# Patient Record
Sex: FEMALE | Race: BLACK OR AFRICAN AMERICAN | NOT HISPANIC OR LATINO | Employment: PART TIME | URBAN - NONMETROPOLITAN AREA
[De-identification: names, ages, dates, MRNs, and addresses within clinical notes are randomized per-mention and may not be internally consistent; named-entity substitution may affect disease eponyms.]

---

## 2019-02-14 ENCOUNTER — HOSPITAL ENCOUNTER (EMERGENCY)
Facility: HOSPITAL | Age: 34
Discharge: HOME | End: 2019-02-14
Attending: EMERGENCY MEDICINE
Payer: COMMERCIAL

## 2019-02-14 VITALS
RESPIRATION RATE: 20 BRPM | DIASTOLIC BLOOD PRESSURE: 79 MMHG | BODY MASS INDEX: 43.2 KG/M2 | HEIGHT: 65 IN | WEIGHT: 259.26 LBS | OXYGEN SATURATION: 100 % | HEART RATE: 101 BPM | TEMPERATURE: 96.2 F | SYSTOLIC BLOOD PRESSURE: 126 MMHG

## 2019-02-14 DIAGNOSIS — H10.9 CONJUNCTIVITIS: Primary | ICD-10-CM

## 2019-02-14 PROCEDURE — 99283 EMERGENCY DEPT VISIT LOW MDM: CPT

## 2019-02-16 NOTE — ED PROVIDER NOTES
History  Chief Complaint   Patient presents with    Eye Problem     bilateral eyes crusted shut  conjunctiva red  History provided by:  Patient  Eye Problem   Location:  Both eyes  Quality: burning itching red crusted  Severity:  Mild  Onset quality:  Sudden  Duration:  4 hours  Timing:  Constant  Progression:  Worsening  Chronicity:  New  Context comment:  1 yr old daughter currently with pink eye this week  Relieved by:  Nothing  Worsened by:  Nothing  Ineffective treatments:  Commercial eye wash  Associated symptoms: crusting, discharge, itching and redness    Associated symptoms: no blurred vision, no decreased vision, no double vision, no facial rash, no headaches, no inflammation, no nausea, no numbness, no photophobia, no scotomas, no swelling, no tearing, no tingling, no vomiting and no weakness    Discharge:     Quality:  Mucous  Risk factors: exposure to pinkeye    Risk factors: no conjunctival hemorrhage, no previous injury to eye, no recent herpes zoster and no recent URI    Risk factors comment:  Wears glasses "sometimes", no contact lenses      Prior to Admission Medications   Prescriptions Last Dose Informant Patient Reported? Taking?   naproxen (NAPROSYN) 500 mg tablet   No No   Sig: Take 1 tablet by mouth 2 (two) times a day with meals  Facility-Administered Medications: None       History reviewed  No pertinent past medical history  Past Surgical History:   Procedure Laterality Date     SECTION         History reviewed  No pertinent family history  I have reviewed and agree with the history as documented  Social History     Tobacco Use    Smoking status: Current Every Day Smoker     Packs/day: 1 00    Smokeless tobacco: Never Used   Substance Use Topics    Alcohol use: No    Drug use: No        Review of Systems   Constitutional: Negative for chills and fever  HENT: Negative for congestion, dental problem, ear pain, facial swelling and sore throat      Eyes: Positive for discharge, redness and itching  Negative for blurred vision, double vision, photophobia, pain and visual disturbance  Respiratory: Negative for cough and shortness of breath  Cardiovascular: Negative for chest pain and leg swelling  Gastrointestinal: Negative for abdominal pain, diarrhea, nausea and vomiting  Genitourinary: Negative for decreased urine volume and difficulty urinating  Musculoskeletal: Negative for back pain and gait problem  Skin: Negative for rash and wound  Allergic/Immunologic: Negative for immunocompromised state  Neurological: Negative for dizziness, tingling, weakness, numbness and headaches  Physical Exam  Physical Exam   Constitutional: She is oriented to person, place, and time  She appears well-developed and well-nourished  No distress  HENT:   Head: Normocephalic and atraumatic  Mouth/Throat: Oropharynx is clear and moist    Eyes: Pupils are equal, round, and reactive to light  EOM are normal  Right eye exhibits discharge  No foreign body present in the right eye  Left eye exhibits no discharge  No foreign body present in the left eye  Right conjunctiva is injected  Right conjunctiva has no hemorrhage  Left conjunctiva is injected  Left conjunctiva has no hemorrhage  Fundoscopic exam:       The right eye shows no hemorrhage  The right eye shows red reflex  The left eye shows no hemorrhage  The left eye shows red reflex  Neck: Neck supple  Cardiovascular: Normal rate, regular rhythm, normal heart sounds and intact distal pulses  Pulmonary/Chest: Effort normal and breath sounds normal  No respiratory distress  She exhibits no tenderness  Abdominal: Soft  Bowel sounds are normal    Neurological: She is alert and oriented to person, place, and time  Skin: Skin is warm and dry  Capillary refill takes less than 2 seconds  She is not diaphoretic  Psychiatric: She has a normal mood and affect     Nursing note and vitals reviewed  Vital Signs  ED Triage Vitals [02/14/19 1556]   Temperature Pulse Respirations Blood Pressure SpO2   (!) 96 2 °F (35 7 °C) 101 20 126/79 100 %      Temp src Heart Rate Source Patient Position - Orthostatic VS BP Location FiO2 (%)   -- Monitor Sitting Right arm --      Pain Score       5           Vitals:    02/14/19 1556   BP: 126/79   Pulse: 101   Patient Position - Orthostatic VS: Sitting       Visual Acuity  Visual Acuity      Most Recent Value   Visual acuity R eye is  20/40   Visual acuity Left eye is  20/30   Visual acuity in both eyes is  20/25          ED Medications  Medications - No data to display    Diagnostic Studies  Results Reviewed     None                 No orders to display              Procedures  Procedures       Phone Contacts  ED Phone Contact    ED Course       MDM  Number of Diagnoses or Management Options  Conjunctivitis:       Disposition  Final diagnoses:   Conjunctivitis     Time reflects when diagnosis was documented in both MDM as applicable and the Disposition within this note     Time User Action Codes Description Comment    2/14/2019  4:08 PM Dg Haro Add [H10 9] Conjunctivitis       ED Disposition     ED Disposition Condition Date/Time Comment    Discharge Stable Thu Feb 14, 2019  4:10 PM Lambert Salt discharge to home/self care  Follow-up Information     Follow up With Specialties Details Why Contact Info    [employer]  Call  To establish [phone]            Discharge Medication List as of 2/14/2019  4:10 PM      START taking these medications    Details   tobramycin in sterile water-balanced salts ophthalmic solution Apply 1-2 drops to eye every 4 (four) hours while awake, Starting Thu 2/14/2019, Print         CONTINUE these medications which have NOT CHANGED    Details   naproxen (NAPROSYN) 500 mg tablet Take 1 tablet by mouth 2 (two) times a day with meals  , Starting 9/20/2016, Until Discontinued, Print           No discharge procedures on file     ED Provider  Electronically Signed by           Willis Mckenzie PA-C  02/16/19 1016

## 2019-04-16 ENCOUNTER — HOSPITAL ENCOUNTER (EMERGENCY)
Facility: HOSPITAL | Age: 34
Discharge: HOME | End: 2019-04-16
Attending: EMERGENCY MEDICINE | Admitting: EMERGENCY MEDICINE
Payer: COMMERCIAL

## 2019-04-16 VITALS
BODY MASS INDEX: 43.34 KG/M2 | SYSTOLIC BLOOD PRESSURE: 169 MMHG | RESPIRATION RATE: 16 BRPM | TEMPERATURE: 97.6 F | HEIGHT: 65 IN | OXYGEN SATURATION: 100 % | WEIGHT: 260.14 LBS | DIASTOLIC BLOOD PRESSURE: 92 MMHG | HEART RATE: 84 BPM

## 2019-04-16 DIAGNOSIS — L03.012 PARONYCHIA OF FINGER OF LEFT HAND: Primary | ICD-10-CM

## 2019-04-16 PROCEDURE — 99283 EMERGENCY DEPT VISIT LOW MDM: CPT | Performed by: EMERGENCY MEDICINE

## 2019-04-16 PROCEDURE — 26010 DRAINAGE OF FINGER ABSCESS: CPT | Performed by: EMERGENCY MEDICINE

## 2019-04-16 PROCEDURE — 99283 EMERGENCY DEPT VISIT LOW MDM: CPT

## 2019-04-16 RX ORDER — CEFADROXIL 500 MG/1
500 CAPSULE ORAL EVERY 12 HOURS SCHEDULED
Qty: 14 CAPSULE | Refills: 0 | Status: SHIPPED | OUTPATIENT
Start: 2019-04-16 | End: 2019-04-23

## 2020-01-14 ENCOUNTER — APPOINTMENT (OUTPATIENT)
Dept: LAB | Facility: HOSPITAL | Age: 35
End: 2020-01-14
Attending: SPECIALIST
Payer: COMMERCIAL

## 2020-01-14 ENCOUNTER — TRANSCRIBE ORDERS (OUTPATIENT)
Dept: LAB | Facility: HOSPITAL | Age: 35
End: 2020-01-14

## 2020-01-14 DIAGNOSIS — Z98.51 SURGICAL HISTORY OF TUBAL LIGATION: ICD-10-CM

## 2020-01-14 DIAGNOSIS — H35.53 CONE DYSFUNCTION SYNDROME: Primary | ICD-10-CM

## 2020-01-14 DIAGNOSIS — Z98.890 S/P CONE BIOPSY OF CERVIX: ICD-10-CM

## 2020-01-14 DIAGNOSIS — H35.53 CONE DYSFUNCTION SYNDROME: ICD-10-CM

## 2020-01-14 LAB
ANION GAP SERPL CALCULATED.3IONS-SCNC: 9 MMOL/L (ref 4–13)
BUN SERPL-MCNC: 8 MG/DL (ref 7–25)
CALCIUM SERPL-MCNC: 8.9 MG/DL (ref 8.6–10.5)
CHLORIDE SERPL-SCNC: 105 MMOL/L (ref 98–107)
CO2 SERPL-SCNC: 24 MMOL/L (ref 21–31)
CREAT SERPL-MCNC: 0.73 MG/DL (ref 0.6–1.2)
ERYTHROCYTE [DISTWIDTH] IN BLOOD BY AUTOMATED COUNT: 16.5 % (ref 11.5–14.5)
GFR SERPL CREATININE-BSD FRML MDRD: 123 ML/MIN/1.73SQ M
GLUCOSE P FAST SERPL-MCNC: 74 MG/DL (ref 65–99)
HCG SERPL QL: NEGATIVE
HCT VFR BLD AUTO: 36.1 % (ref 42–47)
HGB BLD-MCNC: 11.4 G/DL (ref 12–16)
MCH RBC QN AUTO: 26.2 PG (ref 26–34)
MCHC RBC AUTO-ENTMCNC: 31.5 G/DL (ref 31–37)
MCV RBC AUTO: 83 FL (ref 81–99)
PLATELET # BLD AUTO: 419 THOUSANDS/UL (ref 149–390)
PMV BLD AUTO: 7.3 FL (ref 8.6–11.7)
POTASSIUM SERPL-SCNC: 4.5 MMOL/L (ref 3.5–5.5)
RBC # BLD AUTO: 4.34 MILLION/UL (ref 3.9–5.2)
SODIUM SERPL-SCNC: 138 MMOL/L (ref 134–143)
WBC # BLD AUTO: 9.4 THOUSAND/UL (ref 4.8–10.8)

## 2020-01-14 PROCEDURE — 85027 COMPLETE CBC AUTOMATED: CPT

## 2020-01-14 PROCEDURE — 84703 CHORIONIC GONADOTROPIN ASSAY: CPT

## 2020-01-14 PROCEDURE — 80048 BASIC METABOLIC PNL TOTAL CA: CPT

## 2020-01-14 PROCEDURE — 36415 COLL VENOUS BLD VENIPUNCTURE: CPT

## 2020-01-22 NOTE — PRE-PROCEDURE INSTRUCTIONS
No outpatient medications have been marked as taking for the 1/27/20 encounter [facility] HOSPITAL Encounter)  My Surgical Experience    The following information was developed to assist you to prepare for your operation  What do I need to do before coming to the hospital?   Arrange for a responsible person to drive you to and from the hospital    Arrange care for your children at home  Children are not allowed in the recovery areas of the hospital   Plan to wear clothing that is easy to put on and take off  If you are having shoulder surgery, wear a shirt that buttons or zippers in the front  Bathing  o Shower the evening before and the morning of your surgery with an antibacterial soap  Please refer to the Pre Op Showering Instructions for Surgery Patients Sheet   o Remove nail polish and all body piercing jewelry  o Do not shave any body part for at least 24 hours before surgery-this includes face, arms, legs and upper body  Food  o Nothing to eat or drink after midnight the night before your surgery  This includes candy and chewing gum  o Exception: If your surgery is after 12:00pm (noon), you may have clear liquids such as 7-Up®, ginger ale, apple or cranberry juice, Jell-O®, water, or clear broth until 8:00 am  o Do not drink milk or juice with pulp on the morning before surgery  o Do not drink alcohol 24 hours before surgery  Medicine  o Follow instructions you received from your surgeon about which medicines you may take on the day of surgery  o If instructed to take medicine on the morning of surgery, take pills with just a small sip of water  Call your prescribing doctor for specific infroamtion on what to do if you take insulin    What should I bring to the hospital?    Bring:  Miranda James or a walker, if you have them, for foot or knee surgery   A list of the daily medicines, vitamins, minerals, herbals and nutritional supplements you take   Include the dosages of medicines and the time you take them each day   Glasses, dentures or hearing aids   Minimal clothing; you will be wearing hospital sleepwear   Photo ID; required to verify your identity   If you have a Living Will or Power of , bring a copy of the documents   If you have an ostomy, bring an extra pouch and any supplies you use    Do not bring   Medicines or inhalers   Money, valuables or jewelry    What other information should I know about the day of surgery?  Notify your surgeons if you develop a cold, sore throat, cough, fever, rash or any other illness   Report to the Ambulatory Surgical/Same Day Surgery Unit   You will be instructed to stop at Registration only if you have not been pre-registered   Inform your  fi they do not stay that they will be asked by the staff to leave a phone number where they can be reached   Be available to be reached before surgery  In the event the operating room schedule changes, you may be asked to come in earlier or later than expected    *It is important to tell your doctor and others involved in your health care if you are taking or have been taking any non-prescription drugs, vitamins, minerals, herbals or other nutritional supplements   Any of these may interact with some food or medicines and cause a reaction

## 2020-01-27 ENCOUNTER — ANESTHESIA EVENT (OUTPATIENT)
Dept: PERIOP | Facility: HOSPITAL | Age: 35
End: 2020-01-27
Payer: COMMERCIAL

## 2020-01-27 ENCOUNTER — HOSPITAL ENCOUNTER (OUTPATIENT)
Facility: HOSPITAL | Age: 35
Setting detail: OUTPATIENT SURGERY
Discharge: HOME | End: 2020-01-27
Attending: SPECIALIST | Admitting: SPECIALIST
Payer: COMMERCIAL

## 2020-01-27 ENCOUNTER — ANESTHESIA (OUTPATIENT)
Dept: PERIOP | Facility: HOSPITAL | Age: 35
End: 2020-01-27
Payer: COMMERCIAL

## 2020-01-27 VITALS
HEART RATE: 68 BPM | SYSTOLIC BLOOD PRESSURE: 133 MMHG | RESPIRATION RATE: 18 BRPM | TEMPERATURE: 96.5 F | DIASTOLIC BLOOD PRESSURE: 70 MMHG | OXYGEN SATURATION: 100 %

## 2020-01-27 DIAGNOSIS — N87.1 MODERATE CERVICAL DYSPLASIA: ICD-10-CM

## 2020-01-27 DIAGNOSIS — Z30.2 ENCOUNTER FOR STERILIZATION: ICD-10-CM

## 2020-01-27 PROBLEM — R87.613 HGSIL (HIGH GRADE SQUAMOUS INTRAEPITHELIAL LESION) ON PAP SMEAR OF CERVIX: Status: ACTIVE | Noted: 2020-01-27

## 2020-01-27 LAB
EXT PREGNANCY TEST URINE: NEGATIVE
EXT. CONTROL: NORMAL

## 2020-01-27 PROCEDURE — 88305 TISSUE EXAM BY PATHOLOGIST: CPT | Performed by: PATHOLOGY

## 2020-01-27 PROCEDURE — 88307 TISSUE EXAM BY PATHOLOGIST: CPT | Performed by: PATHOLOGY

## 2020-01-27 PROCEDURE — 81025 URINE PREGNANCY TEST: CPT | Performed by: ANESTHESIOLOGY

## 2020-01-27 RX ORDER — ONDANSETRON 2 MG/ML
INJECTION INTRAMUSCULAR; INTRAVENOUS AS NEEDED
Status: DISCONTINUED | OUTPATIENT
Start: 2020-01-27 | End: 2020-01-27 | Stop reason: SURG

## 2020-01-27 RX ORDER — OXYCODONE HYDROCHLORIDE AND ACETAMINOPHEN 5; 325 MG/1; MG/1
1 TABLET ORAL EVERY 4 HOURS PRN
Status: DISCONTINUED | OUTPATIENT
Start: 2020-01-27 | End: 2020-01-27 | Stop reason: HOSPADM

## 2020-01-27 RX ORDER — OXYCODONE HYDROCHLORIDE AND ACETAMINOPHEN 5; 325 MG/1; MG/1
2 TABLET ORAL EVERY 4 HOURS PRN
Status: DISCONTINUED | OUTPATIENT
Start: 2020-01-27 | End: 2020-01-27 | Stop reason: HOSPADM

## 2020-01-27 RX ORDER — MAGNESIUM HYDROXIDE 1200 MG/15ML
LIQUID ORAL AS NEEDED
Status: DISCONTINUED | OUTPATIENT
Start: 2020-01-27 | End: 2020-01-27 | Stop reason: HOSPADM

## 2020-01-27 RX ORDER — FENTANYL CITRATE 50 UG/ML
INJECTION, SOLUTION INTRAMUSCULAR; INTRAVENOUS AS NEEDED
Status: DISCONTINUED | OUTPATIENT
Start: 2020-01-27 | End: 2020-01-27 | Stop reason: SURG

## 2020-01-27 RX ORDER — DEXAMETHASONE SODIUM PHOSPHATE 10 MG/ML
INJECTION, SOLUTION INTRAMUSCULAR; INTRAVENOUS AS NEEDED
Status: DISCONTINUED | OUTPATIENT
Start: 2020-01-27 | End: 2020-01-27 | Stop reason: SURG

## 2020-01-27 RX ORDER — SODIUM CHLORIDE, SODIUM LACTATE, POTASSIUM CHLORIDE, CALCIUM CHLORIDE 600; 310; 30; 20 MG/100ML; MG/100ML; MG/100ML; MG/100ML
125 INJECTION, SOLUTION INTRAVENOUS CONTINUOUS
Status: DISCONTINUED | OUTPATIENT
Start: 2020-01-27 | End: 2020-01-27 | Stop reason: HOSPADM

## 2020-01-27 RX ORDER — LIDOCAINE HYDROCHLORIDE 10 MG/ML
INJECTION, SOLUTION EPIDURAL; INFILTRATION; INTRACAUDAL; PERINEURAL AS NEEDED
Status: DISCONTINUED | OUTPATIENT
Start: 2020-01-27 | End: 2020-01-27 | Stop reason: SURG

## 2020-01-27 RX ORDER — KETOROLAC TROMETHAMINE 30 MG/ML
INJECTION, SOLUTION INTRAMUSCULAR; INTRAVENOUS AS NEEDED
Status: DISCONTINUED | OUTPATIENT
Start: 2020-01-27 | End: 2020-01-27 | Stop reason: SURG

## 2020-01-27 RX ORDER — ONDANSETRON 2 MG/ML
4 INJECTION INTRAMUSCULAR; INTRAVENOUS EVERY 6 HOURS PRN
Status: DISCONTINUED | OUTPATIENT
Start: 2020-01-27 | End: 2020-01-27 | Stop reason: HOSPADM

## 2020-01-27 RX ORDER — ROCURONIUM BROMIDE 10 MG/ML
INJECTION, SOLUTION INTRAVENOUS AS NEEDED
Status: DISCONTINUED | OUTPATIENT
Start: 2020-01-27 | End: 2020-01-27 | Stop reason: SURG

## 2020-01-27 RX ORDER — MIDAZOLAM HYDROCHLORIDE 2 MG/2ML
INJECTION, SOLUTION INTRAMUSCULAR; INTRAVENOUS AS NEEDED
Status: DISCONTINUED | OUTPATIENT
Start: 2020-01-27 | End: 2020-01-27 | Stop reason: SURG

## 2020-01-27 RX ORDER — HYDROMORPHONE HCL/PF 1 MG/ML
0.5 SYRINGE (ML) INJECTION
Status: DISCONTINUED | OUTPATIENT
Start: 2020-01-27 | End: 2020-01-27 | Stop reason: HOSPADM

## 2020-01-27 RX ORDER — PROPOFOL 10 MG/ML
INJECTION, EMULSION INTRAVENOUS AS NEEDED
Status: DISCONTINUED | OUTPATIENT
Start: 2020-01-27 | End: 2020-01-27 | Stop reason: SURG

## 2020-01-27 RX ADMIN — HYDROMORPHONE HYDROCHLORIDE 0.5 MG: 1 INJECTION, SOLUTION INTRAMUSCULAR; INTRAVENOUS; SUBCUTANEOUS at 13:21

## 2020-01-27 RX ADMIN — ONDANSETRON 4 MG: 2 INJECTION INTRAMUSCULAR; INTRAVENOUS at 12:20

## 2020-01-27 RX ADMIN — PROPOFOL 200 MG: 10 INJECTION, EMULSION INTRAVENOUS at 12:14

## 2020-01-27 RX ADMIN — FENTANYL CITRATE 100 MCG: 50 INJECTION INTRAMUSCULAR; INTRAVENOUS at 12:10

## 2020-01-27 RX ADMIN — HYDROMORPHONE HYDROCHLORIDE 0.5 MG: 1 INJECTION, SOLUTION INTRAMUSCULAR; INTRAVENOUS; SUBCUTANEOUS at 13:11

## 2020-01-27 RX ADMIN — SUGAMMADEX 200 MG: 100 INJECTION, SOLUTION INTRAVENOUS at 12:56

## 2020-01-27 RX ADMIN — SODIUM CHLORIDE, POTASSIUM CHLORIDE, SODIUM LACTATE AND CALCIUM CHLORIDE 125 ML/HR: 600; 310; 30; 20 INJECTION, SOLUTION INTRAVENOUS at 13:08

## 2020-01-27 RX ADMIN — MIDAZOLAM HYDROCHLORIDE 2 MG: 1 INJECTION, SOLUTION INTRAMUSCULAR; INTRAVENOUS at 12:10

## 2020-01-27 RX ADMIN — KETOROLAC TROMETHAMINE 30 MG: 30 INJECTION, SOLUTION INTRAMUSCULAR; INTRAVENOUS at 12:25

## 2020-01-27 RX ADMIN — SODIUM CHLORIDE, POTASSIUM CHLORIDE, SODIUM LACTATE AND CALCIUM CHLORIDE 125 ML/HR: 600; 310; 30; 20 INJECTION, SOLUTION INTRAVENOUS at 11:13

## 2020-01-27 RX ADMIN — ROCURONIUM BROMIDE 40 MG: 10 INJECTION INTRAVENOUS at 12:14

## 2020-01-27 RX ADMIN — DEXAMETHASONE SODIUM PHOSPHATE 10 MG: 10 INJECTION, SOLUTION INTRAMUSCULAR; INTRAVENOUS at 12:20

## 2020-01-27 RX ADMIN — LIDOCAINE HYDROCHLORIDE 5 MG: 10 INJECTION, SOLUTION EPIDURAL; INFILTRATION; INTRACAUDAL; PERINEURAL at 12:14

## 2020-01-27 NOTE — OP NOTE
OPERATIVE REPORT  PATIENT NAME: Jan Hyde    :  1985  MRN: [id number]  Pt Location: [address] [place] [address] OR ROOM 02    SURGERY DATE: 2020    Surgeon(s) and Role:     * Radha Ch MD - Primary    Preop Diagnosis:  Encounter for sterilization [Z30 2]  Moderate cervical dysplasia [N87 1]    Post-Op Diagnosis Codes:     * Encounter for sterilization [Z30 2]     * Moderate cervical dysplasia [N87 1]    Procedure(s) (LRB):  LAPAROSCOPIC TUBAL COAGULATION (Bilateral)  BIOPSY CONE (N/A)    Specimen(s):  ID Type Source Tests Collected by Time Destination   1 : cone biopsy Tissue Cervix, Endocervical TISSUE EXAM Radha Ch MD 2020 1225    2 : endocervical currettings Tissue Vaginal/Cervical/Endocervical TISSUE EXAM Radha Ch MD 2020 1225        Estimated Blood Loss:   Minimal    Drains:none  [REMOVED] Urethral Catheter Latex 16 Fr  (Removed)   Number of days: 0       Anesthesia Type:   General    Operative Indications:  Encounter for sterilization [Z30 2]  Moderate cervical dysplasia [N87 1]       Operative Findings:  Cone biopsy:  Medium cone biopsy instrument used  Laparoscopy:  Extensive adhesions of the bladder to the anterior uterine wall  Adhesions of the omentum to the right adnexal area  Adhesions of the omentum to the periumbilical anterior abdominal wall area  None of these were lysed  Normal uterus, normal bilateral ovaries, no evidence of endometriosis  Complications:   None    Procedure and Technique:  Patient was taken to the operating room and placed in the dorsal supine position  She was then induced with general endotracheal anesthesia  Patient was then changed to the dorsal lithotomy position and prepped and draped sterilely for a laparoscopy  A speculum was placed in the vagina  The cervix was identified  Using a medium Clement cone biopsy instrument, a cone biopsy was performed starting at the 12 o'clock position and rotating clockwise for 1 time    Hemostasis was noted to be good  Endocervical curettings were then obtained from the top of the cone  A Atkinson's cannula was then placed in the cervix and held there with a single-tooth tenaculum and a Patton catheter was placed in the bladder  Attention was then turned to the abdomen where a subumbilical incision was made  A Veress needle was then introduced into the peritoneal cavity which was then inflated with 3 L of carbon dioxide gas  A trocar replaced the Veress needle and findings were as noted above  Through the operating port of the laparoscope a Kleppinger forceps was introduced  The right fallopian tube was identified and fulgurated for approximately 3 cm of its length in the isthmic portion  Same procedure was repeated on the opposite side  Hemostasis noted to be good  Laparoscopic instruments were removed and the carbon dioxide gas was let escape  The umbilical incision was then closed using a subcuticular suture of 4 0 Monocryl  Sterile dressings were applied  Vaginal instruments and Patton catheter removed  Patient was then taken to the recovery room in stable condition     I was present for the entire procedure    Patient Disposition:  PACU     SIGNATURE: Mayra Palmer MD  DATE: January 27, 2020  TIME: 1:03 PM

## 2020-01-27 NOTE — H&P
H&P Note - Gynecology   Maria Bowden 37 y o  female MRN: [id number]    Unit/Bed#: OR [place] Encounter: [id number]        ASSESSMENT:  37 y o  female with ELLEN 3 on cervical bx  ECC-    Also requests sterilization    PLAN: Cone bx  Laparoscopic tubal coag  Patient of Dr Shirin Powell      HPI:ASCUS +HPV on PAP   Colposcopy revealed abnl epith  At 12 o'clock  Bx + for CIN3  ECC -          Patient initially seen for sterilization  OBJECTIVE    Historical Information     History reviewed  No pertinent past medical history      Past Surgical History:   Procedure Laterality Date     SECTION         OB History   No data available       Family History   Problem Relation Age of Onset    Diabetes Mother        Social History     Socioeconomic History    Marital status: Single     Spouse name: Not on file    Number of children: Not on file    Years of education: Not on file    Highest education level: Not on file   Occupational History    Not on file   Social Needs    Financial resource strain: Not on file    Food insecurity:     Worry: Not on file     Inability: Not on file    Transportation needs:     Medical: Not on file     Non-medical: Not on file   Tobacco Use    Smoking status: Current Every Day Smoker     Packs/day: 1 00    Smokeless tobacco: Never Used    Tobacco comment: every three days   Substance and Sexual Activity    Alcohol use: No    Drug use: No    Sexual activity: Not on file   Lifestyle    Physical activity:     Days per week: Not on file     Minutes per session: Not on file    Stress: Not on file   Relationships    Social connections:     Talks on phone: Not on file     Gets together: Not on file     Attends Judaism service: Not on file     Active member of club or organization: Not on file     Attends meetings of clubs or organizations: Not on file     Relationship status: Not on file    Intimate partner violence:     Fear of current or ex partner: Not on file     Emotionally abused: Not on file     Physically abused: Not on file     Forced sexual activity: Not on file   Other Topics Concern    Not on file   Social History Narrative    Not on file       Social History     Substance and Sexual Activity   Alcohol Use No     Social History     Substance and Sexual Activity   Drug Use No     Social History     Tobacco Use   Smoking Status Current Every Day Smoker    Packs/day: 1 00   Smokeless Tobacco Never Used   Tobacco Comment    every three days       Medications Prior to Admission   Medication    naproxen (NAPROSYN) 500 mg tablet    tobramycin in sterile water-balanced salts ophthalmic solution     No current facility-administered medications on file prior to encounter  Current Outpatient Medications on File Prior to Encounter   Medication Sig Dispense Refill    naproxen (NAPROSYN) 500 mg tablet Take 1 tablet by mouth 2 (two) times a day with meals  30 tablet 0    tobramycin in sterile water-balanced salts ophthalmic solution Apply 1-2 drops to eye every 4 (four) hours while awake 5 mL 0       No Known Allergies    Patient Vitals for the past 24 hrs:   BP Temp Temp src Pulse Resp SpO2   01/27/20 1105 138/67 (!) 97 3 °F (36 3 °C) Temporal 92 18 100 %     Oxygen Therapy  SpO2: 100 %  I/O     None        No intake or output data in the 24 hours ending 01/27/20 1138    Physical Exam:   General: No Acute Distress   Cardiovascular: Regular Rate and Rhythm   Lungs: Clear to Auscultation Bilaterally, no wheezing, rhonchi or rales   Abdomen: non-tender, no rebound or guarding; Lower Extremities: negative Kody's sign bilaterally   Neuro: Alert, cooperative    Gyn:      Normal external genitalia                Vaginal: no lesions     Cervix: unremarkable appearance      Uterus: unremarkable size   anteverted     Adnexa: no palpable masses         Laboratory Studies:                    Invalid input(s): GLU, CA Invalid input(s): Kendra Moreno input(s): GLU            No results found for: HCG, PROGESTERONE          No results found for: ABO  No results found for: RH  No results found for: ANTIBODYSCR  No results found for: SPECIMEXP    Medications:  Medication Administration - last 24 hours from 01/26/2020 1138 to 01/27/2020 1138       Date/Time Order Dose Route Action Action by     01/27/2020 1113 lactated ringers infusion 125 mL/hr Intravenous New Bag Mayank Castillo RN          Continuous Infusions:    lactated ringers 125 mL/hr Last Rate: 125 mL/hr (01/27/20 1113)       Scheduled Meds:    Current Facility-Administered Medications:  lactated ringers 125 mL/hr Intravenous Continuous Esequiel Darling MD Last Rate: 125 mL/hr (01/27/20 1113)       PRN Meds:      Invasive  Devices: Invasive Devices     Peripheral Intravenous Line            Peripheral IV 01/27/20 Right Forearm less than 1 day                Additional Vitals:    Temp  Min: 97 3 °F (36 3 °C)  Max: 97 3 °F (36 3 °C)                 Other consulting services: I have personally reviewed pertinent reports  Imaging Studies: I have personally reviewed pertinent reports  EKG, Pathology, Microbiology, and Other Studies: I have personally reviewed pertinent reports          Code Status: No Order  Advance Directive and Living Will:      Power of :    POLST:

## 2020-01-27 NOTE — DISCHARGE INSTRUCTIONS
Laparoscopic Tubal Ligation   WHAT YOU SHOULD KNOW:   A laparoscopic tubal ligation is surgery to close your fallopian tubes to prevent pregnancy  AFTER YOU LEAVE:   Medicines:   · Acetaminophen  decreases pain and fever  It is available without a doctor's order  Ask your primary healthcare provider [facility]) how much to take and how often to take it  Follow directions  Acetaminophen can cause liver damage if not taken correctly  · NSAIDs  help decrease swelling, pain, and fever  This medicine is available without a doctor's order  NSAIDs can cause stomach bleeding or kidney problems  If you take blood thinner medicine, always ask your PHP if NSAIDs are safe for you  Always read the medicine label and follow directions  · Prescription pain medicine  may be given  Ask your PHP how to take this medicine safely  · Antibiotics  help treat or prevent a bacterial infection  · Take your medicine as directed  Contact your PHP if you think your medicine is not helping or if you have side effects  Tell him if you are allergic to any medicine  Keep a list of the medicines, vitamins, and herbs you take  Include the amounts, and when and why you take them  Bring the list or the pill bottles to follow-up visits  Carry your medicine list with you in case of an emergency  Follow up with your surgeon or gynecologist as directed:  Write down your questions so you remember to ask them during your visits  Activity:  You may feel like resting more after surgery  Slowly start to do more each day  Rest when you feel it is needed  Ask when you can return to your daily activities  Contact your surgeon or gynecologist if:   · You have a fever  · Your incisions come apart  · You have heavy bleeding from your vagina or your incisions  · You have trouble urinating, burning when you urinate, or bloody urine  · Your incision is red, swollen, or has pus or foul-smelling drainage coming from it       · You have pain that gets worse instead of better, or that is not controlled with your medicine  · Your skin is itchy, swollen, or has a rash  · You are vomiting and are not able to keep food or fluids down for over 24 hours  · You have questions or concerns about your condition or care  Seek care immediately or call 911 if:   · You have sudden shortness of breath or chest pain  · You have heavy vaginal bleeding that fills 1 or more sanitary pads each hour for 4 hours in a row  © 2014 [employer] is for End User's use only and may not be sold, redistributed or otherwise used for commercial purposes  All illustrations and images included in CareNotes® are the copyrighted property of [employer]  or Mani Vann  The above information is an  only  It is not intended as medical advice for individual conditions or treatments  Talk to your doctor, nurse or pharmacist before following any medical regimen to see if it is safe and effective for you

## 2020-01-27 NOTE — ANESTHESIA POSTPROCEDURE EVALUATION
Post-Op Assessment Note    CV Status:  Stable  Pain Score: 0    Pain management: adequate     Mental Status:  Alert   Hydration Status:  Stable   PONV Controlled:  Controlled   Airway Patency:  Patent   Post Op Vitals Reviewed: Yes      Staff: CRNA           BP   112/56   Temp  96 9   Pulse 81   Resp 20   SpO2 100

## 2020-01-27 NOTE — ANESTHESIA PREPROCEDURE EVALUATION
Review of Systems/Medical History  Patient summary reviewed  Chart reviewed      Cardiovascular   Pulmonary  Smoker cigarette smoker  ,        GI/Hepatic  Negative GI/hepatic ROS          Negative  ROS        Endo/Other    Comment: Gestational diabetes    GYN       Hematology  Negative hematology ROS      Musculoskeletal    Arthritis (osteo arthritis)     Neurology   Psychology   Anxiety,          Results for Lauren Edwards (MRN [id number]) as of 1/27/2020 10:06   Ref  Range 1/14/2020 12:40   Sodium Latest Ref Range: 134 - 143 mmol/L 138   Potassium Latest Ref Range: 3 5 - 5 5 mmol/L 4 5   Chloride Latest Ref Range: 98 - 107 mmol/L 105   CO2 Latest Ref Range: 21 - 31 mmol/L 24   Anion Gap Latest Ref Range: 4 - 13 mmol/L 9   BUN Latest Ref Range: 7 - 25 mg/dL 8   Creatinine Latest Ref Range: 0 60 - 1 20 mg/dL 0 73   GLUCOSE FASTING Latest Ref Range: 65 - 99 mg/dL 74   Calcium Latest Ref Range: 8 6 - 10 5 mg/dL 8 9   eGFR Latest Units: ml/min/1 73sq m 123   WBC Latest Ref Range: 4 80 - 10 80 Thousand/uL 9 40   Red Blood Cell Count Latest Ref Range: 3 90 - 5 20 Million/uL 4 34   Hemoglobin Latest Ref Range: 12 0 - 16 0 g/dL 11 4 (L)   HCT Latest Ref Range: 42 0 - 47 0 % 36 1 (L)   MCV Latest Ref Range: 81 - 99 fL 83   MCH Latest Ref Range: 26 0 - 34 0 pg 26 2   MCHC Latest Ref Range: 31 0 - 37 0 g/dL 31 5   RDW Latest Ref Range: 11 5 - 14 5 % 16 5 (H)   Platelet Count Latest Ref Range: 149 - 390 Thousands/uL 419 (H)   MPV Latest Ref Range: 8 6 - 11 7 fL 7 3 (L)            Anesthesia Plan  ASA Score- 2     Anesthesia Type- general with ASA Monitors  Additional Monitors:   Airway Plan: ETT  Plan Factors-    Induction- intravenous  Postoperative Plan- Plan for postoperative opioid use  Planned trial extubation    Informed Consent- Anesthetic plan and risks discussed with patient  I personally reviewed this patient with the CRNA  Discussed and agreed on the Anesthesia Plan with the ROSE MARY Quinones

## 2020-03-24 ENCOUNTER — HOSPITAL ENCOUNTER (EMERGENCY)
Facility: HOSPITAL | Age: 35
Discharge: HOME | End: 2020-03-24
Attending: EMERGENCY MEDICINE | Admitting: EMERGENCY MEDICINE
Payer: COMMERCIAL

## 2020-03-24 VITALS
HEIGHT: 64 IN | OXYGEN SATURATION: 97 % | BODY MASS INDEX: 43.02 KG/M2 | WEIGHT: 251.99 LBS | HEART RATE: 64 BPM | SYSTOLIC BLOOD PRESSURE: 114 MMHG | TEMPERATURE: 98.6 F | DIASTOLIC BLOOD PRESSURE: 66 MMHG | RESPIRATION RATE: 16 BRPM

## 2020-03-24 DIAGNOSIS — R51.9 ACUTE INTRACTABLE HEADACHE, UNSPECIFIED HEADACHE TYPE: Primary | ICD-10-CM

## 2020-03-24 LAB
HOLD SPECIMEN: NORMAL

## 2020-03-24 PROCEDURE — 36415 COLL VENOUS BLD VENIPUNCTURE: CPT | Performed by: EMERGENCY MEDICINE

## 2020-03-24 PROCEDURE — 99283 EMERGENCY DEPT VISIT LOW MDM: CPT

## 2020-03-24 PROCEDURE — 99284 EMERGENCY DEPT VISIT MOD MDM: CPT | Performed by: EMERGENCY MEDICINE

## 2020-03-24 PROCEDURE — 96375 TX/PRO/DX INJ NEW DRUG ADDON: CPT

## 2020-03-24 PROCEDURE — 96366 THER/PROPH/DIAG IV INF ADDON: CPT

## 2020-03-24 PROCEDURE — 96365 THER/PROPH/DIAG IV INF INIT: CPT

## 2020-03-24 RX ORDER — METOCLOPRAMIDE HYDROCHLORIDE 5 MG/ML
10 INJECTION INTRAMUSCULAR; INTRAVENOUS ONCE
Status: COMPLETED | OUTPATIENT
Start: 2020-03-24 | End: 2020-03-24

## 2020-03-24 RX ORDER — MAGNESIUM SULFATE HEPTAHYDRATE 40 MG/ML
2 INJECTION, SOLUTION INTRAVENOUS ONCE
Status: COMPLETED | OUTPATIENT
Start: 2020-03-24 | End: 2020-03-24

## 2020-03-24 RX ORDER — KETOROLAC TROMETHAMINE 30 MG/ML
30 INJECTION, SOLUTION INTRAMUSCULAR; INTRAVENOUS ONCE
Status: COMPLETED | OUTPATIENT
Start: 2020-03-24 | End: 2020-03-24

## 2020-03-24 RX ORDER — ACETAMINOPHEN, ASPIRIN AND CAFFEINE 250; 250; 65 MG/1; MG/1; MG/1
1 TABLET, FILM COATED ORAL EVERY 6 HOURS PRN
COMMUNITY

## 2020-03-24 RX ORDER — NAPROXEN 500 MG/1
500 TABLET ORAL 2 TIMES DAILY WITH MEALS
Qty: 20 TABLET | Refills: 0 | Status: SHIPPED | OUTPATIENT
Start: 2020-03-24 | End: 2021-02-09

## 2020-03-24 RX ORDER — DIPHENHYDRAMINE HYDROCHLORIDE 50 MG/ML
25 INJECTION INTRAMUSCULAR; INTRAVENOUS ONCE
Status: COMPLETED | OUTPATIENT
Start: 2020-03-24 | End: 2020-03-24

## 2020-03-24 RX ADMIN — METOCLOPRAMIDE HYDROCHLORIDE 10 MG: 5 INJECTION INTRAMUSCULAR; INTRAVENOUS at 11:22

## 2020-03-24 RX ADMIN — MAGNESIUM SULFATE HEPTAHYDRATE 2 G: 40 INJECTION, SOLUTION INTRAVENOUS at 11:27

## 2020-03-24 RX ADMIN — KETOROLAC TROMETHAMINE 30 MG: 30 INJECTION, SOLUTION INTRAMUSCULAR at 11:22

## 2020-03-24 RX ADMIN — DIPHENHYDRAMINE HYDROCHLORIDE 25 MG: 50 INJECTION, SOLUTION INTRAMUSCULAR; INTRAVENOUS at 11:22

## 2020-03-24 RX ADMIN — SODIUM CHLORIDE 1000 ML: 0.9 INJECTION, SOLUTION INTRAVENOUS at 11:27

## 2020-03-24 NOTE — ED PROVIDER NOTES
History  Chief Complaint   Patient presents with    Headache     5 days of headache  taking excedrin and asa without relief  c/o nausea with it  Patient complains of 5 days of sharp, left sided, throbbing headache that started while at work  She works at [employer] as a   She denies similar sick contacts or chemical exposures  She reports a random h/o prior headaches that are usually frontal and not sharp  She has had persistent headache despite taking Excedrin or aspirin  She goes to sleep with a headache and wakes up with a headache but is not awakened from sleep due to the headache  Two days ago she developed photophobia  This morning she developed nausea without vomiting  She has never been diagnosed with migraines  She takes no medicines daily  She denies possibility of pregnancy due to tubal ligation  HA is a/w nausea, photophobia and malaise  Denies f/c, neck or back pain, CP, SOB, abdominal pain, v/d  12 system ROS o/w negative                    History provided by:  Patient and medical records  Headache   Pain location:  L temporal and L parietal  Quality:  Sharp  Radiates to:  Does not radiate  Severity currently:  9/10  Severity at highest:  9/10  Onset quality:  Unable to specify  Duration:  5 days  Timing:  Constant  Progression:  Unchanged  Chronicity:  New  Similar to prior headaches: no    Context: bright light    Relieved by:  Nothing  Worsened by:  Nothing  Ineffective treatments:  Aspirin (Excedrin)  Associated symptoms: nausea and photophobia    Associated symptoms: no abdominal pain, no back pain, no blurred vision, no congestion, no cough, no diarrhea, no dizziness, no drainage, no eye pain, no facial pain, no fever, no focal weakness, no loss of balance, no myalgias, no near-syncope, no neck pain, no neck stiffness, no numbness, no paresthesias, no seizures, no sore throat, no syncope, no URI, no visual change, no vomiting and no weakness    Risk factors comment: Smoker      Prior to Admission Medications   Prescriptions Last Dose Informant Patient Reported? Taking? ASPIRIN ADULT PO   Yes Yes   Sig: Take by mouth   aspirin-acetaminophen-caffeine (EXCEDRIN MIGRAINE) 250-250-65 MG per tablet   Yes Yes   Sig: Take 1 tablet by mouth every 6 (six) hours as needed for headaches      Facility-Administered Medications: None       History reviewed  No pertinent past medical history  Past Surgical History:   Procedure Laterality Date    CERVICAL BIOPSY N/A 2020    Procedure: BIOPSY CONE;  Surgeon: Cynthia Russell MD;  Location: [facility] MAIN OR;  Service: Gynecology     SECTION      AK LAP,TUBAL CAUTERY Bilateral 2020    Procedure: LAPAROSCOPIC TUBAL COAGULATION;  Surgeon: Cynthia Russell MD;  Location: [facility] MAIN OR;  Service: Gynecology       Family History   Problem Relation Age of Onset    Diabetes Mother      I have reviewed and agree with the history as documented  E-Cigarette/Vaping    E-Cigarette Use Never User      E-Cigarette/Vaping Substances     Social History     Tobacco Use    Smoking status: Current Every Day Smoker     Packs/day: 0 50     Types: Cigarettes    Smokeless tobacco: Never Used    Tobacco comment: every three days   Substance Use Topics    Alcohol use: No    Drug use: No       Review of Systems   Constitutional: Negative for chills and fever  HENT: Negative for congestion, facial swelling, postnasal drip and sore throat  Eyes: Positive for photophobia  Negative for blurred vision, pain and visual disturbance  Respiratory: Negative for cough, chest tightness and shortness of breath  Cardiovascular: Negative for chest pain, leg swelling, syncope and near-syncope  Gastrointestinal: Positive for nausea  Negative for abdominal distention, abdominal pain, diarrhea and vomiting  Genitourinary: Negative for dysuria and flank pain  Musculoskeletal: Negative for back pain, myalgias, neck pain and neck stiffness     Skin: Negative for pallor and rash  Neurological: Positive for headaches  Negative for dizziness, focal weakness, seizures, facial asymmetry, weakness, light-headedness, numbness, paresthesias and loss of balance  Hematological: Negative for adenopathy  Psychiatric/Behavioral: Negative for confusion  All other systems reviewed and are negative  Physical Exam  Physical Exam   Constitutional: She is oriented to person, place, and time  She appears well-developed and well-nourished  No distress (Though appears uncomfortable)  HENT:   Head: Normocephalic and atraumatic  Mouth/Throat: Oropharynx is clear and moist  No oropharyngeal exudate  Eyes: Pupils are equal, round, and reactive to light  Conjunctivae and EOM are normal  No scleral icterus    (+) photophobia   Neck: Normal range of motion  Neck supple  No nuchal rigidity   Cardiovascular: Normal rate and regular rhythm  No murmur heard  Pulmonary/Chest: Effort normal and breath sounds normal    Abdominal: Soft  Bowel sounds are normal  There is no tenderness  Obese   Musculoskeletal: Normal range of motion  She exhibits no tenderness  Lymphadenopathy:     She has no cervical adenopathy  Neurological: She is alert and oriented to person, place, and time  She has normal reflexes  No cranial nerve deficit or sensory deficit  She exhibits normal muscle tone  Skin: Skin is warm and dry  No rash noted  She is not diaphoretic  Psychiatric: She has a normal mood and affect  Her behavior is normal  Thought content normal    Vitals reviewed        Vital Signs  ED Triage Vitals [03/24/20 1100]   Temperature Pulse Respirations Blood Pressure SpO2   98 6 °F (37 °C) 94 14 124/80 100 %      Temp Source Heart Rate Source Patient Position - Orthostatic VS BP Location FiO2 (%)   Temporal Monitor Sitting Right arm --      Pain Score       Worst Possible Pain           Vitals:    03/24/20 1130 03/24/20 1200 03/24/20 1230 03/24/20 1300   BP: 109/72 106/63 108/69 114/66   Pulse: 83 67 72 64   Patient Position - Orthostatic VS:   Sitting Sitting         Visual Acuity  Visual Acuity      Most Recent Value   L Pupil Size (mm)  4   R Pupil Size (mm)  4          ED Medications  Medications   diphenhydrAMINE (BENADRYL) injection 25 mg (25 mg Intravenous Given 3/24/20 1122)   metoclopramide (REGLAN) injection 10 mg (10 mg Intravenous Given 3/24/20 1122)   ketorolac (TORADOL) injection 30 mg (30 mg Intravenous Given 3/24/20 1122)   magnesium sulfate 2 g/50 mL IVPB (premix) 2 g (0 g Intravenous Stopped 3/24/20 1311)   sodium chloride 0 9 % bolus 1,000 mL (0 mL Intravenous Stopped 3/24/20 1312)       Diagnostic Studies  Results Reviewed     Procedure Component Value Units Date/Time    Alton draw [[id number]] Collected:  03/24/20 1126    Lab Status:  Final result Specimen:  Blood from Arm, Right Updated:  03/24/20 1301    Narrative: The following orders were created for panel order Alton draw  Procedure                               Abnormality         Status                     ---------                               -----------         ------                     Sable Arelis Top on hold[[id number]]                           Final result               Gold top on SAPR[[id number]]                                 Final result               Green / Yellow tube on hold[[id number]]                      Final result               Green / Black tube on RZRK[[id number]]                       Final result               Lavender Top 3 ml on hold[[id number]]                        Final result               Lavender Top 7ml on hold[[id number]]                         Final result                 Please view results for these tests on the individual orders  No orders to display              Procedures  Procedures         ED Course  ED Course as of Mar 24 1313   Tue Mar 24, 2020   1215 Patient feeling somewhat better  Mag nearly finished  IVF in process   Will recheck when complete  MDM  Number of Diagnoses or Management Options  Diagnosis management comments: DDx: HA - migraine, doubt ICH, edema, pseudotumor cerebri  A/P: Will treat symptoms, reevaluate for further w/u or disposition  Amount and/or Complexity of Data Reviewed  Review and summarize past medical records: yes          Disposition  Final diagnoses:   Acute intractable headache, unspecified headache type     Time reflects when diagnosis was documented in both MDM as applicable and the Disposition within this note     Time User Action Codes Description Comment    3/24/2020 12:50 PM [address] [facility] [address]  [address], [address] [facility] Acute headache     3/24/2020 12:50 PM [address] [place] [address],[address]  [address], Mina Add [R51] Acute intractable headache, unspecified headache type     3/24/2020 12:50 PM Vinay Pacheco Modify [R51] Acute intractable headache, unspecified headache type     3/24/2020 12:50 PM Vinay Dawsonster Remove [R51] Acute headache       ED Disposition     ED Disposition Condition Date/Time Comment    Discharge Stable Tue Mar 24, 2020 12:50 PM Jp Feliciano discharge to home/self care  Follow-up Information     Follow up With Specialties Details Why [address]  Call in 1 day To establish a family doctor for follow-up, preventative and ongoing care  [phone]            Patient's Medications   Discharge Prescriptions    NAPROXEN (NAPROSYN) 500 MG TABLET    Take 1 tablet (500 mg total) by mouth 2 (two) times a day with meals       Start Date: 3/24/2020 End Date: --       Order Dose: 500 mg       Quantity: 20 tablet    Refills: 0     No discharge procedures on file      PDMP Review     None          ED Provider  Electronically Signed by           Shria Carias DO  03/24/20 0128

## 2020-03-24 NOTE — ED NOTES
Warm blanket provided, lights dimmed  Call bell in reach  Pt repositioned per request to a lower semi fowlers       Dell Mccray RN  03/24/20 5003

## 2020-04-27 ENCOUNTER — HOSPITAL ENCOUNTER (EMERGENCY)
Facility: HOSPITAL | Age: 35
Discharge: HOME | End: 2020-04-27
Attending: EMERGENCY MEDICINE | Admitting: EMERGENCY MEDICINE
Payer: COMMERCIAL

## 2020-04-27 VITALS
SYSTOLIC BLOOD PRESSURE: 124 MMHG | TEMPERATURE: 98.4 F | DIASTOLIC BLOOD PRESSURE: 88 MMHG | HEART RATE: 74 BPM | RESPIRATION RATE: 16 BRPM | HEIGHT: 64 IN | WEIGHT: 254.19 LBS | OXYGEN SATURATION: 100 % | BODY MASS INDEX: 43.4 KG/M2

## 2020-04-27 DIAGNOSIS — G43.909 MIGRAINE: Primary | ICD-10-CM

## 2020-04-27 LAB
EXT PREG TEST URINE: NEGATIVE
EXT. CONTROL ED NAV: NORMAL

## 2020-04-27 PROCEDURE — 96361 HYDRATE IV INFUSION ADD-ON: CPT

## 2020-04-27 PROCEDURE — 96365 THER/PROPH/DIAG IV INF INIT: CPT

## 2020-04-27 PROCEDURE — 99283 EMERGENCY DEPT VISIT LOW MDM: CPT

## 2020-04-27 PROCEDURE — 81025 URINE PREGNANCY TEST: CPT | Performed by: EMERGENCY MEDICINE

## 2020-04-27 PROCEDURE — 96375 TX/PRO/DX INJ NEW DRUG ADDON: CPT

## 2020-04-27 PROCEDURE — 99284 EMERGENCY DEPT VISIT MOD MDM: CPT | Performed by: EMERGENCY MEDICINE

## 2020-04-27 RX ORDER — METOCLOPRAMIDE HYDROCHLORIDE 5 MG/ML
10 INJECTION INTRAMUSCULAR; INTRAVENOUS ONCE
Status: COMPLETED | OUTPATIENT
Start: 2020-04-27 | End: 2020-04-27

## 2020-04-27 RX ORDER — MAGNESIUM SULFATE HEPTAHYDRATE 40 MG/ML
2 INJECTION, SOLUTION INTRAVENOUS ONCE
Status: COMPLETED | OUTPATIENT
Start: 2020-04-27 | End: 2020-04-27

## 2020-04-27 RX ORDER — ONDANSETRON 4 MG/1
4 TABLET, ORALLY DISINTEGRATING ORAL EVERY 8 HOURS PRN
Qty: 12 TABLET | Refills: 0 | Status: SHIPPED | OUTPATIENT
Start: 2020-04-27 | End: 2021-02-09

## 2020-04-27 RX ORDER — METOCLOPRAMIDE 10 MG/1
10 TABLET ORAL EVERY 6 HOURS
Qty: 30 TABLET | Refills: 0 | Status: SHIPPED | OUTPATIENT
Start: 2020-04-27 | End: 2021-02-09

## 2020-04-27 RX ORDER — DEXAMETHASONE SODIUM PHOSPHATE 10 MG/ML
10 INJECTION, SOLUTION INTRAMUSCULAR; INTRAVENOUS ONCE
Status: COMPLETED | OUTPATIENT
Start: 2020-04-27 | End: 2020-04-27

## 2020-04-27 RX ORDER — DIPHENHYDRAMINE HCL 25 MG
CAPSULE ORAL
Qty: 20 CAPSULE | Refills: 0 | Status: SHIPPED | OUTPATIENT
Start: 2020-04-27 | End: 2021-02-09

## 2020-04-27 RX ORDER — DIPHENHYDRAMINE HYDROCHLORIDE 50 MG/ML
25 INJECTION INTRAMUSCULAR; INTRAVENOUS ONCE
Status: COMPLETED | OUTPATIENT
Start: 2020-04-27 | End: 2020-04-27

## 2020-04-27 RX ORDER — IBUPROFEN 600 MG/1
600 TABLET ORAL EVERY 6 HOURS PRN
Qty: 30 TABLET | Refills: 0 | Status: SHIPPED | OUTPATIENT
Start: 2020-04-27

## 2020-04-27 RX ADMIN — DIPHENHYDRAMINE HYDROCHLORIDE 25 MG: 50 INJECTION, SOLUTION INTRAMUSCULAR; INTRAVENOUS at 20:08

## 2020-04-27 RX ADMIN — MAGNESIUM SULFATE HEPTAHYDRATE 2 G: 40 INJECTION, SOLUTION INTRAVENOUS at 20:12

## 2020-04-27 RX ADMIN — DEXAMETHASONE SODIUM PHOSPHATE 10 MG: 10 INJECTION, SOLUTION INTRAMUSCULAR; INTRAVENOUS at 20:10

## 2020-04-27 RX ADMIN — METOCLOPRAMIDE HYDROCHLORIDE 10 MG: 5 INJECTION INTRAMUSCULAR; INTRAVENOUS at 20:09

## 2020-04-27 RX ADMIN — SODIUM CHLORIDE 1000 ML: 0.9 INJECTION, SOLUTION INTRAVENOUS at 20:02

## 2020-05-22 ENCOUNTER — TELEPHONE (OUTPATIENT)
Dept: NEUROLOGY | Facility: CLINIC | Age: 35
End: 2020-05-22

## 2020-06-01 ENCOUNTER — CONSULT (OUTPATIENT)
Dept: NEUROLOGY | Facility: CLINIC | Age: 35
End: 2020-06-01
Payer: COMMERCIAL

## 2020-06-01 VITALS
HEART RATE: 81 BPM | DIASTOLIC BLOOD PRESSURE: 67 MMHG | SYSTOLIC BLOOD PRESSURE: 117 MMHG | BODY MASS INDEX: 43.84 KG/M2 | HEIGHT: 64 IN | TEMPERATURE: 98 F | WEIGHT: 256.8 LBS

## 2020-06-01 DIAGNOSIS — R20.2 NUMBNESS AND TINGLING IN BOTH HANDS: ICD-10-CM

## 2020-06-01 DIAGNOSIS — G43.109 MIGRAINE WITH AURA AND WITHOUT STATUS MIGRAINOSUS, NOT INTRACTABLE: Primary | ICD-10-CM

## 2020-06-01 DIAGNOSIS — R20.0 NUMBNESS AND TINGLING OF BOTH FEET: ICD-10-CM

## 2020-06-01 DIAGNOSIS — R20.2 NUMBNESS AND TINGLING OF BOTH FEET: ICD-10-CM

## 2020-06-01 DIAGNOSIS — H53.9 VISUAL DISTURBANCES: ICD-10-CM

## 2020-06-01 DIAGNOSIS — R20.0 NUMBNESS AND TINGLING IN BOTH HANDS: ICD-10-CM

## 2020-06-01 DIAGNOSIS — G43.809 VESTIBULAR MIGRAINE: ICD-10-CM

## 2020-06-01 PROCEDURE — 99245 OFF/OP CONSLTJ NEW/EST HI 55: CPT | Performed by: PSYCHIATRY & NEUROLOGY

## 2020-06-01 PROCEDURE — 96372 THER/PROPH/DIAG INJ SC/IM: CPT | Performed by: PSYCHIATRY & NEUROLOGY

## 2020-06-01 RX ORDER — PROCHLORPERAZINE EDISYLATE 5 MG/ML
10 INJECTION INTRAMUSCULAR; INTRAVENOUS ONCE
Status: COMPLETED | OUTPATIENT
Start: 2020-06-01 | End: 2020-06-02

## 2020-06-01 RX ORDER — SUMATRIPTAN 50 MG/1
TABLET, FILM COATED ORAL
Qty: 9 TABLET | Refills: 3 | Status: SHIPPED | OUTPATIENT
Start: 2020-06-01 | End: 2021-02-09

## 2020-06-01 RX ORDER — KETOROLAC TROMETHAMINE 30 MG/ML
60 INJECTION, SOLUTION INTRAMUSCULAR; INTRAVENOUS ONCE
Status: COMPLETED | OUTPATIENT
Start: 2020-06-01 | End: 2020-06-02

## 2020-06-01 RX ORDER — GABAPENTIN 100 MG/1
100 CAPSULE ORAL 3 TIMES DAILY
Qty: 90 CAPSULE | Refills: 3 | Status: SHIPPED | OUTPATIENT
Start: 2020-06-01 | End: 2021-02-09

## 2020-06-01 RX ORDER — TOPIRAMATE 25 MG/1
CAPSULE, COATED PELLETS ORAL
Qty: 120 CAPSULE | Refills: 2 | Status: SHIPPED | OUTPATIENT
Start: 2020-06-01 | End: 2020-09-08

## 2020-06-01 RX ORDER — BUTALBITAL, ACETAMINOPHEN AND CAFFEINE 50; 325; 40 MG/1; MG/1; MG/1
1 TABLET ORAL EVERY 6 HOURS PRN
Qty: 12 TABLET | Refills: 3 | Status: SHIPPED | OUTPATIENT
Start: 2020-06-01 | End: 2021-02-09

## 2020-06-02 ENCOUNTER — DOCUMENTATION (OUTPATIENT)
Dept: NEUROLOGY | Facility: CLINIC | Age: 35
End: 2020-06-02

## 2020-06-02 RX ADMIN — PROCHLORPERAZINE EDISYLATE 10 MG: 5 INJECTION INTRAMUSCULAR; INTRAVENOUS at 14:12

## 2020-06-02 RX ADMIN — KETOROLAC TROMETHAMINE 60 MG: 30 INJECTION, SOLUTION INTRAMUSCULAR; INTRAVENOUS at 14:06

## 2020-06-15 ENCOUNTER — HOSPITAL ENCOUNTER (OUTPATIENT)
Dept: MRI IMAGING | Facility: HOSPITAL | Age: 35
Discharge: HOME | End: 2020-06-15
Payer: COMMERCIAL

## 2020-06-15 DIAGNOSIS — G43.109 MIGRAINE WITH AURA AND WITHOUT STATUS MIGRAINOSUS, NOT INTRACTABLE: ICD-10-CM

## 2020-06-15 PROCEDURE — 70553 MRI BRAIN STEM W/O & W/DYE: CPT

## 2020-06-15 PROCEDURE — A9585 GADOBUTROL INJECTION: HCPCS | Performed by: PSYCHIATRY & NEUROLOGY

## 2020-06-15 RX ADMIN — GADOBUTROL 11 ML: 604.72 INJECTION INTRAVENOUS at 10:20

## 2020-06-17 ENCOUNTER — TELEPHONE (OUTPATIENT)
Dept: NEUROLOGY | Facility: CLINIC | Age: 35
End: 2020-06-17

## 2020-07-07 ENCOUNTER — TELEPHONE (OUTPATIENT)
Dept: NEUROLOGY | Facility: CLINIC | Age: 35
End: 2020-07-07

## 2020-07-07 NOTE — TELEPHONE ENCOUNTER
Unable to confirm 7/8/2020 9:30AM [address] Giles at [facility] due to voicemail not set up and if patient returns call will need to review covid screening questions and if patient would like to complete registration, please transfer to [address]  Please make aware of mask, visitor and temp policy

## 2020-07-07 NOTE — TELEPHONE ENCOUNTER
6/30/2020 & 7/8/2020 FAXED [facility] PHYS ORD REQ TO PCP/JT  7/7/2020 DR EVANS'S OFFICE LEFT A MESS ON MY VOICEMAIL LATE ON 7/6/2020 STATING THEY HAVE NEVER SEEN PT   7/8/2020 AT 8:57AM, TRIED CALLING PT -VOICE MAIL BOX NOT SET UP YET-UNABLE TO LEAVE A MESSAGE- NEED NEW PCP NAME AND PHONE # IN ORDER TO REQ PHYS ORDER

## 2020-09-06 DIAGNOSIS — H53.9 VISUAL DISTURBANCES: ICD-10-CM

## 2020-09-06 DIAGNOSIS — G43.109 MIGRAINE WITH AURA AND WITHOUT STATUS MIGRAINOSUS, NOT INTRACTABLE: ICD-10-CM

## 2020-09-08 RX ORDER — TOPIRAMATE 25 MG/1
CAPSULE, COATED PELLETS ORAL
Qty: 120 CAPSULE | Refills: 2 | Status: SHIPPED | OUTPATIENT
Start: 2020-09-08 | End: 2021-10-19 | Stop reason: SDUPTHER

## 2020-11-12 ENCOUNTER — OFFICE VISIT (OUTPATIENT)
Dept: FAMILY MEDICINE CLINIC | Facility: HOME HEALTHCARE | Age: 35
End: 2020-11-12
Payer: COMMERCIAL

## 2020-11-12 VITALS
RESPIRATION RATE: 16 BRPM | SYSTOLIC BLOOD PRESSURE: 130 MMHG | TEMPERATURE: 97 F | OXYGEN SATURATION: 98 % | HEART RATE: 108 BPM | BODY MASS INDEX: 41.3 KG/M2 | DIASTOLIC BLOOD PRESSURE: 90 MMHG | WEIGHT: 240.6 LBS

## 2020-11-12 DIAGNOSIS — G89.29 OTHER CHRONIC PAIN: ICD-10-CM

## 2020-11-12 DIAGNOSIS — M19.90 OSTEOARTHRITIS, UNSPECIFIED OSTEOARTHRITIS TYPE, UNSPECIFIED SITE: ICD-10-CM

## 2020-11-12 DIAGNOSIS — Z11.4 ENCOUNTER FOR SCREENING FOR HUMAN IMMUNODEFICIENCY VIRUS (HIV): ICD-10-CM

## 2020-11-12 DIAGNOSIS — E66.01 CLASS 3 SEVERE OBESITY DUE TO EXCESS CALORIES WITHOUT SERIOUS COMORBIDITY WITH BODY MASS INDEX (BMI) OF 40.0 TO 44.9 IN ADULT (HCC): ICD-10-CM

## 2020-11-12 DIAGNOSIS — Z12.4 SCREENING FOR CERVICAL CANCER: Primary | ICD-10-CM

## 2020-11-12 PROCEDURE — 99213 OFFICE O/P EST LOW 20 MIN: CPT | Performed by: FAMILY MEDICINE

## 2020-11-12 PROCEDURE — T1015 CLINIC SERVICE: HCPCS | Performed by: FAMILY MEDICINE

## 2020-11-12 RX ORDER — DULOXETIN HYDROCHLORIDE 30 MG/1
30 CAPSULE, DELAYED RELEASE ORAL DAILY
Qty: 30 CAPSULE | Refills: 2 | Status: SHIPPED | OUTPATIENT
Start: 2020-11-12 | End: 2021-02-09

## 2020-11-16 ENCOUNTER — OFFICE VISIT (OUTPATIENT)
Dept: OBGYN CLINIC | Facility: CLINIC | Age: 35
End: 2020-11-16
Payer: COMMERCIAL

## 2020-11-16 ENCOUNTER — APPOINTMENT (OUTPATIENT)
Dept: RADIOLOGY | Facility: MEDICAL CENTER | Age: 35
End: 2020-11-16
Payer: COMMERCIAL

## 2020-11-16 VITALS
HEART RATE: 81 BPM | SYSTOLIC BLOOD PRESSURE: 118 MMHG | BODY MASS INDEX: 40.97 KG/M2 | WEIGHT: 240 LBS | DIASTOLIC BLOOD PRESSURE: 74 MMHG | TEMPERATURE: 97.9 F | HEIGHT: 64 IN

## 2020-11-16 DIAGNOSIS — M19.90 OSTEOARTHRITIS, UNSPECIFIED OSTEOARTHRITIS TYPE, UNSPECIFIED SITE: ICD-10-CM

## 2020-11-16 DIAGNOSIS — M22.2X2 PATELLOFEMORAL PAIN SYNDROME OF LEFT KNEE: ICD-10-CM

## 2020-11-16 PROCEDURE — 99203 OFFICE O/P NEW LOW 30 MIN: CPT | Performed by: ORTHOPAEDIC SURGERY

## 2020-11-16 PROCEDURE — 3008F BODY MASS INDEX DOCD: CPT | Performed by: ORTHOPAEDIC SURGERY

## 2020-11-16 PROCEDURE — 20610 DRAIN/INJ JOINT/BURSA W/O US: CPT | Performed by: ORTHOPAEDIC SURGERY

## 2020-11-16 PROCEDURE — 73564 X-RAY EXAM KNEE 4 OR MORE: CPT

## 2020-11-16 RX ORDER — LIDOCAINE HYDROCHLORIDE 10 MG/ML
5 INJECTION, SOLUTION INFILTRATION; PERINEURAL
Status: COMPLETED | OUTPATIENT
Start: 2020-11-16 | End: 2020-11-16

## 2020-11-16 RX ORDER — BETAMETHASONE SODIUM PHOSPHATE AND BETAMETHASONE ACETATE 3; 3 MG/ML; MG/ML
6 INJECTION, SUSPENSION INTRA-ARTICULAR; INTRALESIONAL; INTRAMUSCULAR; SOFT TISSUE
Status: COMPLETED | OUTPATIENT
Start: 2020-11-16 | End: 2020-11-16

## 2020-11-16 RX ADMIN — BETAMETHASONE SODIUM PHOSPHATE AND BETAMETHASONE ACETATE 6 MG: 3; 3 INJECTION, SUSPENSION INTRA-ARTICULAR; INTRALESIONAL; INTRAMUSCULAR; SOFT TISSUE at 17:28

## 2020-11-16 RX ADMIN — LIDOCAINE HYDROCHLORIDE 5 ML: 10 INJECTION, SOLUTION INFILTRATION; PERINEURAL at 17:28

## 2021-02-09 ENCOUNTER — HOSPITAL ENCOUNTER (EMERGENCY)
Facility: HOSPITAL | Age: 36
Discharge: HOME | End: 2021-02-09
Attending: EMERGENCY MEDICINE
Payer: COMMERCIAL

## 2021-02-09 ENCOUNTER — APPOINTMENT (EMERGENCY)
Dept: RADIOLOGY | Facility: HOSPITAL | Age: 36
End: 2021-02-09
Payer: COMMERCIAL

## 2021-02-09 VITALS
RESPIRATION RATE: 16 BRPM | OXYGEN SATURATION: 98 % | BODY MASS INDEX: 47.61 KG/M2 | DIASTOLIC BLOOD PRESSURE: 71 MMHG | SYSTOLIC BLOOD PRESSURE: 107 MMHG | TEMPERATURE: 96.9 F | HEIGHT: 60 IN | HEART RATE: 67 BPM | WEIGHT: 242.51 LBS

## 2021-02-09 DIAGNOSIS — J40 BRONCHITIS: Primary | ICD-10-CM

## 2021-02-09 LAB
ALBUMIN SERPL BCP-MCNC: 3.2 G/DL (ref 3.5–5)
ALP SERPL-CCNC: 65 U/L (ref 46–116)
ALT SERPL W P-5'-P-CCNC: 28 U/L (ref 12–78)
ANION GAP SERPL CALCULATED.3IONS-SCNC: 9 MMOL/L (ref 4–13)
APTT PPP: 28 SECONDS (ref 23–37)
AST SERPL W P-5'-P-CCNC: 21 U/L (ref 5–45)
BASOPHILS # BLD AUTO: 0.07 THOUSANDS/ΜL (ref 0–0.1)
BASOPHILS NFR BLD AUTO: 1 % (ref 0–1)
BILIRUB SERPL-MCNC: 0.2 MG/DL (ref 0.2–1)
BUN SERPL-MCNC: 13 MG/DL (ref 5–25)
CALCIUM ALBUM COR SERPL-MCNC: 9.2 MG/DL (ref 8.3–10.1)
CALCIUM SERPL-MCNC: 8.6 MG/DL (ref 8.3–10.1)
CHLORIDE SERPL-SCNC: 108 MMOL/L (ref 100–108)
CO2 SERPL-SCNC: 23 MMOL/L (ref 21–32)
CREAT SERPL-MCNC: 0.88 MG/DL (ref 0.6–1.3)
D DIMER PPP FEU-MCNC: 0.35 UG/ML FEU
EOSINOPHIL # BLD AUTO: 0.07 THOUSAND/ΜL (ref 0–0.61)
EOSINOPHIL NFR BLD AUTO: 1 % (ref 0–6)
ERYTHROCYTE [DISTWIDTH] IN BLOOD BY AUTOMATED COUNT: 15.6 % (ref 11.6–15.1)
FLUAV RNA RESP QL NAA+PROBE: NEGATIVE
FLUBV RNA RESP QL NAA+PROBE: NEGATIVE
GFR SERPL CREATININE-BSD FRML MDRD: 97 ML/MIN/1.73SQ M
GLUCOSE SERPL-MCNC: 86 MG/DL (ref 65–140)
HCT VFR BLD AUTO: 36 % (ref 34.8–46.1)
HGB BLD-MCNC: 11 G/DL (ref 11.5–15.4)
IMM GRANULOCYTES # BLD AUTO: 0.03 THOUSAND/UL (ref 0–0.2)
IMM GRANULOCYTES NFR BLD AUTO: 0 % (ref 0–2)
INR PPP: 0.99 (ref 0.84–1.19)
LIPASE SERPL-CCNC: 152 U/L (ref 73–393)
LYMPHOCYTES # BLD AUTO: 3.75 THOUSANDS/ΜL (ref 0.6–4.47)
LYMPHOCYTES NFR BLD AUTO: 40 % (ref 14–44)
MCH RBC QN AUTO: 26.7 PG (ref 26.8–34.3)
MCHC RBC AUTO-ENTMCNC: 30.6 G/DL (ref 31.4–37.4)
MCV RBC AUTO: 87 FL (ref 82–98)
MONOCYTES # BLD AUTO: 0.57 THOUSAND/ΜL (ref 0.17–1.22)
MONOCYTES NFR BLD AUTO: 6 % (ref 4–12)
NEUTROPHILS # BLD AUTO: 4.87 THOUSANDS/ΜL (ref 1.85–7.62)
NEUTS SEG NFR BLD AUTO: 52 % (ref 43–75)
NRBC BLD AUTO-RTO: 0 /100 WBCS
PLATELET # BLD AUTO: 397 THOUSANDS/UL (ref 149–390)
PMV BLD AUTO: 8.7 FL (ref 8.9–12.7)
POTASSIUM SERPL-SCNC: 4.2 MMOL/L (ref 3.5–5.3)
PROT SERPL-MCNC: 7 G/DL (ref 6.4–8.2)
PROTHROMBIN TIME: 12.9 SECONDS (ref 11.6–14.5)
RBC # BLD AUTO: 4.12 MILLION/UL (ref 3.81–5.12)
RSV RNA RESP QL NAA+PROBE: NEGATIVE
SARS-COV-2 RNA RESP QL NAA+PROBE: NEGATIVE
SODIUM SERPL-SCNC: 140 MMOL/L (ref 136–145)
TROPONIN I SERPL-MCNC: <0.02 NG/ML
WBC # BLD AUTO: 9.36 THOUSAND/UL (ref 4.31–10.16)

## 2021-02-09 PROCEDURE — 85379 FIBRIN DEGRADATION QUANT: CPT | Performed by: PHYSICIAN ASSISTANT

## 2021-02-09 PROCEDURE — 85025 COMPLETE CBC W/AUTO DIFF WBC: CPT | Performed by: PHYSICIAN ASSISTANT

## 2021-02-09 PROCEDURE — 36415 COLL VENOUS BLD VENIPUNCTURE: CPT | Performed by: PHYSICIAN ASSISTANT

## 2021-02-09 PROCEDURE — 85610 PROTHROMBIN TIME: CPT | Performed by: PHYSICIAN ASSISTANT

## 2021-02-09 PROCEDURE — 71045 X-RAY EXAM CHEST 1 VIEW: CPT

## 2021-02-09 PROCEDURE — 84484 ASSAY OF TROPONIN QUANT: CPT | Performed by: PHYSICIAN ASSISTANT

## 2021-02-09 PROCEDURE — 99285 EMERGENCY DEPT VISIT HI MDM: CPT

## 2021-02-09 PROCEDURE — 80053 COMPREHEN METABOLIC PANEL: CPT | Performed by: PHYSICIAN ASSISTANT

## 2021-02-09 PROCEDURE — 85730 THROMBOPLASTIN TIME PARTIAL: CPT | Performed by: PHYSICIAN ASSISTANT

## 2021-02-09 PROCEDURE — 93005 ELECTROCARDIOGRAM TRACING: CPT

## 2021-02-09 PROCEDURE — 99284 EMERGENCY DEPT VISIT MOD MDM: CPT | Performed by: PHYSICIAN ASSISTANT

## 2021-02-09 PROCEDURE — 83690 ASSAY OF LIPASE: CPT | Performed by: PHYSICIAN ASSISTANT

## 2021-02-09 PROCEDURE — 0241U HB NFCT DS VIR RESP RNA 4 TRGT: CPT | Performed by: PHYSICIAN ASSISTANT

## 2021-02-09 RX ORDER — ALBUTEROL SULFATE 90 UG/1
2 AEROSOL, METERED RESPIRATORY (INHALATION) EVERY 6 HOURS PRN
Qty: 18 G | Refills: 0 | Status: SHIPPED | OUTPATIENT
Start: 2021-02-09 | End: 2021-11-08 | Stop reason: ALTCHOICE

## 2021-02-09 RX ORDER — PREDNISONE 50 MG/1
50 TABLET ORAL DAILY
Qty: 5 TABLET | Refills: 0 | Status: SHIPPED | OUTPATIENT
Start: 2021-02-09 | End: 2021-10-19 | Stop reason: ALTCHOICE

## 2021-02-09 RX ORDER — AZITHROMYCIN 250 MG/1
TABLET, FILM COATED ORAL
Qty: 6 TABLET | Refills: 0 | Status: SHIPPED | OUTPATIENT
Start: 2021-02-09 | End: 2021-02-13

## 2021-02-09 NOTE — Clinical Note
Jan Hyde was seen and treated in our emergency department on 2/9/2021  Diagnosis: bronchitis    Bindu Valera  may return to work on return date  She may return on this date: 02/11/2021         If you have any questions or concerns, please don't hesitate to call        Maame Gracia PA-C    ______________________________           _______________          _______________  Hospital Representative                              Date                                Time

## 2021-02-09 NOTE — ED PROVIDER NOTES
History  Chief Complaint   Patient presents with    Chest Pain     patient reports cough and intermittent chest pain since yesterday  patient reports sob with excertion  Patient presents to the emergency department today for evaluation chest pain shortness of breath with exertion cough  She states symptoms commence yesterday, she was at work completing some normal physical activity when she noted she was short of breath  Short of breath with was exertion  She is noting anterior chest pain which is still current since yesterday  Cough dry nonproductive  No fevers  No abdominal pain nausea vomiting  No back pain  Prior to Admission Medications   Prescriptions Last Dose Informant Patient Reported? Taking?   aspirin-acetaminophen-caffeine (EXCEDRIN MIGRAINE) 250-250-65 MG per tablet   Yes Yes   Sig: Take 1 tablet by mouth every 6 (six) hours as needed for headaches   ibuprofen (MOTRIN) 600 mg tablet   No Yes   Sig: Take 1 tablet (600 mg total) by mouth every 6 (six) hours as needed (pain, fever (= 3 of the 200 mg OTC tablets))   topiramate (TOPAMAX) 25 mg sprinkle capsule   No Yes   Sig: take 1 capsule by mouth AT NIGHT FOR 3 NIGHTS, THEN INCREASE BY 1 CAPSULE EVERY THIRD NIGHT UNTIL 4 CAPSULES AT NIGHT      Facility-Administered Medications: None       Past Medical History:   Diagnosis Date    Migraine     Osteoarthritis        Past Surgical History:   Procedure Laterality Date    CERVICAL BIOPSY N/A 2020    Procedure: BIOPSY CONE;  Surgeon: Josseline Dickey MD;  Location: [address] [place] [address] OR;  Service: Gynecology     SECTION      DE LAP,TUBAL CAUTERY Bilateral 2020    Procedure: LAPAROSCOPIC TUBAL COAGULATION;  Surgeon: Josseline Dickey MD;  Location: [address] [place] [address] OR;  Service: Gynecology       Family History   Problem Relation Age of Onset    Diabetes Mother      I have reviewed and agree with the history as documented      E-Cigarette/Vaping    E-Cigarette Use Never User E-Cigarette/Vaping Substances     Social History     Tobacco Use    Smoking status: Current Every Day Smoker     Packs/day: 0 50     Types: Cigarettes    Smokeless tobacco: Never Used    Tobacco comment: every three days   Substance Use Topics    Alcohol use: No    Drug use: Yes     Types: Marijuana       Review of Systems   Constitutional: Negative for chills and fever  HENT: Negative for ear pain and sore throat  Eyes: Negative for pain and visual disturbance  Respiratory: Positive for cough and shortness of breath  Cardiovascular: Positive for chest pain  Negative for palpitations  Gastrointestinal: Negative for abdominal pain and vomiting  Genitourinary: Negative for dysuria and hematuria  Musculoskeletal: Negative for arthralgias and back pain  Skin: Negative for color change and rash  Neurological: Negative for seizures and syncope  Hematological: Negative  Psychiatric/Behavioral: Negative  All other systems reviewed and are negative  Physical Exam  Physical Exam  Vitals signs and nursing note reviewed  Constitutional:       General: She is not in acute distress  Appearance: She is well-developed  She is not ill-appearing, toxic-appearing or diaphoretic  HENT:      Head: Normocephalic and atraumatic  Eyes:      Conjunctiva/sclera: Conjunctivae normal       Pupils: Pupils are equal, round, and reactive to light  Neck:      Musculoskeletal: Normal range of motion and neck supple  Cardiovascular:      Rate and Rhythm: Normal rate and regular rhythm  Heart sounds: No murmur  Pulmonary:      Effort: Pulmonary effort is normal  No respiratory distress  Breath sounds: Normal breath sounds  No decreased breath sounds, wheezing, rhonchi or rales  Abdominal:      Palpations: Abdomen is soft  Tenderness: There is no abdominal tenderness  Musculoskeletal: Normal range of motion  Right lower leg: No edema  Left lower leg: No edema  Skin:     General: Skin is warm and dry  Capillary Refill: Capillary refill takes less than 2 seconds  Neurological:      General: No focal deficit present  Mental Status: She is alert  Vital Signs  ED Triage Vitals   Temperature Pulse Respirations Blood Pressure SpO2   02/09/21 0921 02/09/21 0921 02/09/21 0921 02/09/21 0924 02/09/21 0921   (!) 96 9 °F (36 1 °C) 101 18 124/74 99 %      Temp Source Heart Rate Source Patient Position - Orthostatic VS BP Location FiO2 (%)   02/09/21 0921 02/09/21 0921 02/09/21 0921 02/09/21 0921 --   Temporal Monitor Sitting Right arm       Pain Score       02/09/21 0921       7           Vitals:    02/09/21 0921 02/09/21 0924 02/09/21 1000 02/09/21 1030   BP:  124/74 106/67 103/62   Pulse: 101  74 68   Patient Position - Orthostatic VS: Sitting  Sitting Sitting         Visual Acuity      ED Medications  Medications - No data to display    Diagnostic Studies  Results Reviewed     Procedure Component Value Units Date/Time    COVID19, Influenza A/B, RSV PCR, [facility] [[id number]]  (Normal) Collected: 02/09/21 0934    Lab Status: Final result Specimen: Nares from Nasopharyngeal Swab Updated: 02/09/21 1027     SARS-CoV-2 Negative     INFLUENZA A PCR Negative     INFLUENZA B PCR Negative     RSV PCR Negative    Narrative: This test has been authorized by FDA under an EUA (Emergency Use Assay) for use by authorized laboratories  Clinical caution and judgement should be used with the interpretation of these results with consideration of the clinical impression and other laboratory testing  Testing reported as "Positive" or "Negative" has been proven to be accurate according to standard laboratory validation requirements  All testing is performed with control materials showing appropriate reactivity at standard intervals      D-Dimer [[id number]]  (Normal) Collected: 02/09/21 0932    Lab Status: Final result Specimen: Blood from Arm, Left Updated: 02/09/21 1024 D-Dimer, Quant 0 35 ug/ml FEU     Protime-INR [[id number]]  (Normal) Collected: 02/09/21 0932    Lab Status: Final result Specimen: Blood from Arm, Left Updated: 02/09/21 1024     Protime 12 9 seconds      INR 0 99    APTT [[id number]]  (Normal) Collected: 02/09/21 0932    Lab Status: Final result Specimen: Blood from Arm, Left Updated: 02/09/21 1024     PTT 28 seconds     Troponin I [[id number]]  (Normal) Collected: 02/09/21 0932    Lab Status: Final result Specimen: Blood from Arm, Left Updated: 02/09/21 1002     Troponin I <0 02 ng/mL     Comprehensive metabolic panel [[id number]]  (Abnormal) Collected: 02/09/21 0932    Lab Status: Final result Specimen: Blood from Arm, Left Updated: 02/09/21 1000     Sodium 140 mmol/L      Potassium 4 2 mmol/L      Chloride 108 mmol/L      CO2 23 mmol/L      ANION GAP 9 mmol/L      BUN 13 mg/dL      Creatinine 0 88 mg/dL      Glucose 86 mg/dL      Calcium 8 6 mg/dL      Corrected Calcium 9 2 mg/dL      AST 21 U/L      ALT 28 U/L      Alkaline Phosphatase 65 U/L      Total Protein 7 0 g/dL      Albumin 3 2 g/dL      Total Bilirubin 0 20 mg/dL      eGFR 97 ml/min/1 73sq m     Narrative:      [place] guidelines for Chronic Kidney Disease (CKD):     Stage 1 with normal or high GFR (GFR > 90 mL/min/1 73 square meters)    Stage 2 Mild CKD (GFR = 60-89 mL/min/1 73 square meters)    Stage 3A Moderate CKD (GFR = 45-59 mL/min/1 73 square meters)    Stage 3B Moderate CKD (GFR = 30-44 mL/min/1 73 square meters)    Stage 4 Severe CKD (GFR = 15-29 mL/min/1 73 square meters)    Stage 5 End Stage CKD (GFR <15 mL/min/1 73 square meters)  Note: GFR calculation is accurate only with a steady state creatinine    Lipase [[id number]]  (Normal) Collected: 02/09/21 0932    Lab Status: Final result Specimen: Blood from Arm, Left Updated: 02/09/21 0955     Lipase 152 u/L     CBC and differential [[id number]]  (Abnormal) Collected: 02/09/21 0932    Lab Status: Final result Specimen: Blood from Arm, Left Updated: 02/09/21 0946     WBC 9 36 Thousand/uL      RBC 4 12 Million/uL      Hemoglobin 11 0 g/dL      Hematocrit 36 0 %      MCV 87 fL      MCH 26 7 pg      MCHC 30 6 g/dL      RDW 15 6 %      MPV 8 7 fL      Platelets 727 Thousands/uL      nRBC 0 /100 WBCs      Neutrophils Relative 52 %      Immat GRANS % 0 %      Lymphocytes Relative 40 %      Monocytes Relative 6 %      Eosinophils Relative 1 %      Basophils Relative 1 %      Neutrophils Absolute 4 87 Thousands/µL      Immature Grans Absolute 0 03 Thousand/uL      Lymphocytes Absolute 3 75 Thousands/µL      Monocytes Absolute 0 57 Thousand/µL      Eosinophils Absolute 0 07 Thousand/µL      Basophils Absolute 0 07 Thousands/µL                  XR chest 1 view portable   ED Interpretation by Momo Marcus PA-C (02/09 1945)   No evidence of acute cardiopulmonary process                 Procedures  ECG 12 Lead Documentation Only    Date/Time: 2/9/2021 9:36 AM  Performed by: Momo Marcus PA-C  Authorized by: Momo Marcus PA-C     Indications / Diagnosis:  Chest pain  ECG reviewed by me, the ED Provider: yes    Patient location:  ED  Previous ECG:     Comparison to cardiac monitor: Yes    Interpretation:     Interpretation: normal    Rate:     ECG rate:  82    ECG rate assessment: normal    Rhythm:     Rhythm: sinus rhythm    Ectopy:     Ectopy: none    QRS:     QRS axis:  Normal    QRS intervals:  Normal  Conduction:     Conduction: normal    ST segments:     ST segments:  Normal  T waves:     T waves: normal               ED Course  ED Course as of Feb 09 1101   Tue Feb 09, 2021   0925 Blood Pressure: 124/74   0925 Temperature(!): 96 9 °F (36 1 °C)   0925 Pulse: 101   0925 Respirations: 18   0925 SpO2: 99 %   0947 WBC: 9 36   0947 Hemoglobin(!): 11 0   0947 Platelet Count(!): 678   1000 Sodium: 140   1000 eGFR: 97   1000 TOTAL BILIRUBIN: 0 20   1000 Lipase: 152   1000 WBC: 9 36   1000 Hemoglobin(!): 11 0   1000 Platelet Count(!): 397   1003 Troponin I: <0 02   1003 Awaiting dimer      1029 RSV PCR: Negative   1029 INFLU B PCR: Negative   1029 INFLU A PCR: Negative   1029 SARS-COV-2: Negative   1029 D-Dimer, Quant: 0 35   1038 Awaiting chest then likely d/c                HEART Risk Score      Most Recent Value   Heart Score Risk Calculator   History  0 Filed at: 02/09/2021 0936   ECG  0 Filed at: 02/09/2021 8818   Age  0 Filed at: 02/09/2021 3672   Risk Factors  1 Filed at: 02/09/2021 0936   Troponin  0 Filed at: 02/09/2021 5462   HEART Score  1 Filed at: 02/09/2021 1054                          Wells' Criteria for PE      Most Recent Value   Wells' Criteria for PE   Clinical signs and symptoms of DVT  0 Filed at: 02/09/2021 6378   PE is primary diagnosis or equally likely  3 Filed at: 02/09/2021 0936   HR >100  0 Filed at: 02/09/2021 0936   Immobilization at least 3 days or Surgery in the previous 4 weeks  0 Filed at: 02/09/2021 1112   Previous, objectively diagnosed PE or DVT  0 Filed at: 02/09/2021 0936   Hemoptysis  0 Filed at: 02/09/2021 4587   Malignancy with treatment within 6 months or palliative  0 Filed at: 02/09/2021 1657   Wells' Criteria Total  3 Filed at: 02/09/2021 3849                MDM    Disposition  Final diagnoses:   Bronchitis     Time reflects when diagnosis was documented in both MDM as applicable and the Disposition within this note     Time User Action Codes Description Comment    2/9/2021 10:57 AM Stefania Gastelum Add [J40] Bronchitis       ED Disposition     ED Disposition Condition Date/Time Comment    Discharge Stable Tue Feb 9, 2021 10:57 AM Jp Feliciano discharge to home/self care              Follow-up Information     Follow up With Specialties Details Why [address], CRNP Nurse Practitioner Schedule an appointment as soon as possible for a visit  As needed [address]  [phone]            Patient's Medications   Discharge Prescriptions ALBUTEROL (VENTOLIN HFA) 90 MCG/ACT INHALER    Inhale 2 puffs every 6 (six) hours as needed for wheezing       Start Date: 2/9/2021  End Date: --       Order Dose: 2 puffs       Quantity: 18 g    Refills: 0    AZITHROMYCIN (ZITHROMAX Z-JENNIFER) 250 MG TABLET    Take 2 tablets today then 1 tablet daily x 4 days       Start Date: 2/9/2021  End Date: 2/13/2021       Order Dose: --       Quantity: 6 tablet    Refills: 0    PREDNISONE 50 MG TABLET    Take 1 tablet (50 mg total) by mouth daily       Start Date: 2/9/2021  End Date: --       Order Dose: 50 mg       Quantity: 5 tablet    Refills: 0     No discharge procedures on file      PDMP Review     None          ED Provider  Electronically Signed by           Yvon Zazueta PA-C  02/09/21 8470

## 2021-02-10 LAB
ATRIAL RATE: 82 BPM
P AXIS: 77 DEGREES
PR INTERVAL: 178 MS
QRS AXIS: 67 DEGREES
QRSD INTERVAL: 78 MS
QT INTERVAL: 358 MS
QTC INTERVAL: 418 MS
T WAVE AXIS: 43 DEGREES
VENTRICULAR RATE: 82 BPM

## 2021-02-10 PROCEDURE — 93010 ELECTROCARDIOGRAM REPORT: CPT | Performed by: INTERNAL MEDICINE

## 2021-06-24 ENCOUNTER — TELEPHONE (OUTPATIENT)
Dept: OBGYN CLINIC | Facility: CLINIC | Age: 36
End: 2021-06-24

## 2021-06-24 NOTE — TELEPHONE ENCOUNTER
Attempted to reach patient to r/s her appt tomorrow 6/25 to an earlier time or a later date  Karen will not be in the office at that time  Patient's mailbox has not been set up yet  Unable to leave a message

## 2021-08-10 DIAGNOSIS — Z20.822 EXPOSURE TO COVID-19 VIRUS: Primary | ICD-10-CM

## 2021-08-10 PROCEDURE — U0003 INFECTIOUS AGENT DETECTION BY NUCLEIC ACID (DNA OR RNA); SEVERE ACUTE RESPIRATORY SYNDROME CORONAVIRUS 2 (SARS-COV-2) (CORONAVIRUS DISEASE [COVID-19]), AMPLIFIED PROBE TECHNIQUE, MAKING USE OF HIGH THROUGHPUT TECHNOLOGIES AS DESCRIBED BY CMS-2020-01-R: HCPCS | Performed by: PHYSICIAN ASSISTANT

## 2021-08-10 PROCEDURE — U0005 INFEC AGEN DETEC AMPLI PROBE: HCPCS | Performed by: PHYSICIAN ASSISTANT

## 2021-09-27 ENCOUNTER — HOSPITAL ENCOUNTER (EMERGENCY)
Facility: HOSPITAL | Age: 36
Discharge: HOME | End: 2021-09-27
Attending: EMERGENCY MEDICINE
Payer: COMMERCIAL

## 2021-09-27 ENCOUNTER — APPOINTMENT (EMERGENCY)
Dept: RADIOLOGY | Facility: HOSPITAL | Age: 36
End: 2021-09-27
Payer: COMMERCIAL

## 2021-09-27 VITALS
DIASTOLIC BLOOD PRESSURE: 87 MMHG | BODY MASS INDEX: 48.1 KG/M2 | OXYGEN SATURATION: 99 % | WEIGHT: 245 LBS | SYSTOLIC BLOOD PRESSURE: 121 MMHG | HEART RATE: 77 BPM | RESPIRATION RATE: 18 BRPM | HEIGHT: 60 IN | TEMPERATURE: 98.1 F

## 2021-09-27 DIAGNOSIS — J40 BRONCHITIS: Primary | ICD-10-CM

## 2021-09-27 LAB
ANION GAP SERPL CALCULATED.3IONS-SCNC: 8 MMOL/L (ref 4–13)
BASOPHILS # BLD AUTO: 0.09 THOUSANDS/ΜL (ref 0–0.1)
BASOPHILS NFR BLD AUTO: 1 % (ref 0–1)
BUN SERPL-MCNC: 8 MG/DL (ref 5–25)
CALCIUM SERPL-MCNC: 8.6 MG/DL (ref 8.3–10.1)
CHLORIDE SERPL-SCNC: 106 MMOL/L (ref 100–108)
CO2 SERPL-SCNC: 25 MMOL/L (ref 21–32)
CREAT SERPL-MCNC: 0.77 MG/DL (ref 0.6–1.3)
EOSINOPHIL # BLD AUTO: 0.18 THOUSAND/ΜL (ref 0–0.61)
EOSINOPHIL NFR BLD AUTO: 2 % (ref 0–6)
ERYTHROCYTE [DISTWIDTH] IN BLOOD BY AUTOMATED COUNT: 15.9 % (ref 11.6–15.1)
EXT PREG TEST URINE: NEGATIVE
EXT. CONTROL ED NAV: NORMAL
GFR SERPL CREATININE-BSD FRML MDRD: 113 ML/MIN/1.73SQ M
GLUCOSE SERPL-MCNC: 93 MG/DL (ref 65–140)
HCT VFR BLD AUTO: 39 % (ref 34.8–46.1)
HGB BLD-MCNC: 11.7 G/DL (ref 11.5–15.4)
IMM GRANULOCYTES # BLD AUTO: 0.04 THOUSAND/UL (ref 0–0.2)
IMM GRANULOCYTES NFR BLD AUTO: 0 % (ref 0–2)
LYMPHOCYTES # BLD AUTO: 3.94 THOUSANDS/ΜL (ref 0.6–4.47)
LYMPHOCYTES NFR BLD AUTO: 34 % (ref 14–44)
MCH RBC QN AUTO: 25.7 PG (ref 26.8–34.3)
MCHC RBC AUTO-ENTMCNC: 30 G/DL (ref 31.4–37.4)
MCV RBC AUTO: 86 FL (ref 82–98)
MONOCYTES # BLD AUTO: 0.61 THOUSAND/ΜL (ref 0.17–1.22)
MONOCYTES NFR BLD AUTO: 5 % (ref 4–12)
NEUTROPHILS # BLD AUTO: 6.65 THOUSANDS/ΜL (ref 1.85–7.62)
NEUTS SEG NFR BLD AUTO: 58 % (ref 43–75)
NRBC BLD AUTO-RTO: 0 /100 WBCS
PLATELET # BLD AUTO: 380 THOUSANDS/UL (ref 149–390)
PMV BLD AUTO: 8.6 FL (ref 8.9–12.7)
POTASSIUM SERPL-SCNC: 3.9 MMOL/L (ref 3.5–5.3)
RBC # BLD AUTO: 4.56 MILLION/UL (ref 3.81–5.12)
SARS-COV-2 RNA RESP QL NAA+PROBE: NEGATIVE
SODIUM SERPL-SCNC: 139 MMOL/L (ref 136–145)
TROPONIN I SERPL-MCNC: <0.02 NG/ML
WBC # BLD AUTO: 11.51 THOUSAND/UL (ref 4.31–10.16)

## 2021-09-27 PROCEDURE — 81025 URINE PREGNANCY TEST: CPT | Performed by: PHYSICIAN ASSISTANT

## 2021-09-27 PROCEDURE — 84484 ASSAY OF TROPONIN QUANT: CPT | Performed by: PHYSICIAN ASSISTANT

## 2021-09-27 PROCEDURE — U0005 INFEC AGEN DETEC AMPLI PROBE: HCPCS | Performed by: PHYSICIAN ASSISTANT

## 2021-09-27 PROCEDURE — 99285 EMERGENCY DEPT VISIT HI MDM: CPT | Performed by: PHYSICIAN ASSISTANT

## 2021-09-27 PROCEDURE — 85025 COMPLETE CBC W/AUTO DIFF WBC: CPT | Performed by: PHYSICIAN ASSISTANT

## 2021-09-27 PROCEDURE — 36415 COLL VENOUS BLD VENIPUNCTURE: CPT | Performed by: PHYSICIAN ASSISTANT

## 2021-09-27 PROCEDURE — U0003 INFECTIOUS AGENT DETECTION BY NUCLEIC ACID (DNA OR RNA); SEVERE ACUTE RESPIRATORY SYNDROME CORONAVIRUS 2 (SARS-COV-2) (CORONAVIRUS DISEASE [COVID-19]), AMPLIFIED PROBE TECHNIQUE, MAKING USE OF HIGH THROUGHPUT TECHNOLOGIES AS DESCRIBED BY CMS-2020-01-R: HCPCS | Performed by: PHYSICIAN ASSISTANT

## 2021-09-27 PROCEDURE — 99284 EMERGENCY DEPT VISIT MOD MDM: CPT

## 2021-09-27 PROCEDURE — 80048 BASIC METABOLIC PNL TOTAL CA: CPT | Performed by: PHYSICIAN ASSISTANT

## 2021-09-27 PROCEDURE — 71045 X-RAY EXAM CHEST 1 VIEW: CPT

## 2021-09-27 PROCEDURE — 93005 ELECTROCARDIOGRAM TRACING: CPT

## 2021-09-27 RX ORDER — PREDNISONE 20 MG/1
40 TABLET ORAL ONCE
Status: COMPLETED | OUTPATIENT
Start: 2021-09-27 | End: 2021-09-27

## 2021-09-27 RX ORDER — AZITHROMYCIN 250 MG/1
250 TABLET, FILM COATED ORAL EVERY 24 HOURS
Qty: 4 TABLET | Refills: 0 | Status: SHIPPED | OUTPATIENT
Start: 2021-09-28 | End: 2021-10-02

## 2021-09-27 RX ORDER — AZITHROMYCIN 250 MG/1
500 TABLET, FILM COATED ORAL ONCE
Status: COMPLETED | OUTPATIENT
Start: 2021-09-27 | End: 2021-09-27

## 2021-09-27 RX ORDER — ALBUTEROL SULFATE 90 UG/1
1-2 AEROSOL, METERED RESPIRATORY (INHALATION) EVERY 4 HOURS PRN
Qty: 8 G | Refills: 0 | Status: SHIPPED | OUTPATIENT
Start: 2021-09-27 | End: 2021-11-08 | Stop reason: ALTCHOICE

## 2021-09-27 RX ORDER — PREDNISONE 20 MG/1
40 TABLET ORAL DAILY
Qty: 8 TABLET | Refills: 0 | Status: SHIPPED | OUTPATIENT
Start: 2021-09-28 | End: 2021-10-02

## 2021-09-27 RX ORDER — BENZONATATE 100 MG/1
100 CAPSULE ORAL 3 TIMES DAILY PRN
Qty: 21 CAPSULE | Refills: 0 | Status: SHIPPED | OUTPATIENT
Start: 2021-09-27 | End: 2021-10-19 | Stop reason: ALTCHOICE

## 2021-09-27 RX ORDER — ALBUTEROL SULFATE 90 UG/1
4 AEROSOL, METERED RESPIRATORY (INHALATION) ONCE
Status: COMPLETED | OUTPATIENT
Start: 2021-09-27 | End: 2021-09-27

## 2021-09-27 RX ADMIN — ALBUTEROL SULFATE 4 PUFF: 90 AEROSOL, METERED RESPIRATORY (INHALATION) at 10:10

## 2021-09-27 RX ADMIN — PREDNISONE 40 MG: 20 TABLET ORAL at 11:40

## 2021-09-27 RX ADMIN — AZITHROMYCIN MONOHYDRATE 500 MG: 250 TABLET ORAL at 11:40

## 2021-09-28 LAB
ATRIAL RATE: 68 BPM
ATRIAL RATE: 72 BPM
P AXIS: 37 DEGREES
P AXIS: 50 DEGREES
PR INTERVAL: 170 MS
PR INTERVAL: 176 MS
QRS AXIS: 51 DEGREES
QRS AXIS: 67 DEGREES
QRSD INTERVAL: 78 MS
QRSD INTERVAL: 78 MS
QT INTERVAL: 370 MS
QT INTERVAL: 376 MS
QTC INTERVAL: 399 MS
QTC INTERVAL: 405 MS
T WAVE AXIS: 26 DEGREES
T WAVE AXIS: 35 DEGREES
VENTRICULAR RATE: 68 BPM
VENTRICULAR RATE: 72 BPM

## 2021-09-28 PROCEDURE — 93010 ELECTROCARDIOGRAM REPORT: CPT | Performed by: INTERNAL MEDICINE

## 2021-10-19 ENCOUNTER — OFFICE VISIT (OUTPATIENT)
Dept: FAMILY MEDICINE CLINIC | Facility: HOME HEALTHCARE | Age: 36
End: 2021-10-19
Payer: COMMERCIAL

## 2021-10-19 VITALS
SYSTOLIC BLOOD PRESSURE: 128 MMHG | HEIGHT: 64 IN | OXYGEN SATURATION: 99 % | TEMPERATURE: 97.7 F | WEIGHT: 252.8 LBS | HEART RATE: 108 BPM | BODY MASS INDEX: 43.16 KG/M2 | RESPIRATION RATE: 16 BRPM | DIASTOLIC BLOOD PRESSURE: 80 MMHG

## 2021-10-19 DIAGNOSIS — G43.109 MIGRAINE WITH AURA AND WITHOUT STATUS MIGRAINOSUS, NOT INTRACTABLE: ICD-10-CM

## 2021-10-19 DIAGNOSIS — F17.210 CIGARETTE SMOKER: ICD-10-CM

## 2021-10-19 DIAGNOSIS — Z11.59 ENCOUNTER FOR HEPATITIS C SCREENING TEST FOR LOW RISK PATIENT: ICD-10-CM

## 2021-10-19 DIAGNOSIS — Z87.09 HISTORY OF ACUTE BRONCHITIS: Primary | ICD-10-CM

## 2021-10-19 PROCEDURE — T1015 CLINIC SERVICE: HCPCS | Performed by: FAMILY MEDICINE

## 2021-10-19 PROCEDURE — 99213 OFFICE O/P EST LOW 20 MIN: CPT | Performed by: FAMILY MEDICINE

## 2021-10-19 RX ORDER — TOPIRAMATE 25 MG/1
CAPSULE, COATED PELLETS ORAL
Qty: 120 CAPSULE | Refills: 2 | Status: SHIPPED | OUTPATIENT
Start: 2021-10-19

## 2021-11-08 ENCOUNTER — OFFICE VISIT (OUTPATIENT)
Dept: FAMILY MEDICINE CLINIC | Facility: HOME HEALTHCARE | Age: 36
End: 2021-11-08
Payer: COMMERCIAL

## 2021-11-08 VITALS
DIASTOLIC BLOOD PRESSURE: 78 MMHG | OXYGEN SATURATION: 99 % | WEIGHT: 249.8 LBS | BODY MASS INDEX: 42.65 KG/M2 | SYSTOLIC BLOOD PRESSURE: 120 MMHG | HEIGHT: 64 IN | RESPIRATION RATE: 16 BRPM | HEART RATE: 98 BPM | TEMPERATURE: 97.8 F

## 2021-11-08 DIAGNOSIS — Z13.228 SCREENING FOR ENDOCRINE, NUTRITIONAL, METABOLIC AND IMMUNITY DISORDER: ICD-10-CM

## 2021-11-08 DIAGNOSIS — Z13.21 SCREENING FOR ENDOCRINE, NUTRITIONAL, METABOLIC AND IMMUNITY DISORDER: ICD-10-CM

## 2021-11-08 DIAGNOSIS — Z13.0 SCREENING FOR ENDOCRINE, NUTRITIONAL, METABOLIC AND IMMUNITY DISORDER: ICD-10-CM

## 2021-11-08 DIAGNOSIS — Z13.29 SCREENING FOR ENDOCRINE, NUTRITIONAL, METABOLIC AND IMMUNITY DISORDER: ICD-10-CM

## 2021-11-08 DIAGNOSIS — Z23 NEED FOR TDAP VACCINATION: ICD-10-CM

## 2021-11-08 DIAGNOSIS — E66.01 MORBID OBESITY WITH BMI OF 40.0-44.9, ADULT (HCC): ICD-10-CM

## 2021-11-08 DIAGNOSIS — F17.210 CIGARETTE SMOKER: ICD-10-CM

## 2021-11-08 DIAGNOSIS — Z23 ENCOUNTER FOR IMMUNIZATION: ICD-10-CM

## 2021-11-08 DIAGNOSIS — Z00.00 ANNUAL PHYSICAL EXAM: Primary | ICD-10-CM

## 2021-11-08 DIAGNOSIS — Z23 NEED FOR INFLUENZA VACCINATION: ICD-10-CM

## 2021-11-08 PROCEDURE — 90715 TDAP VACCINE 7 YRS/> IM: CPT

## 2021-11-08 PROCEDURE — 90686 IIV4 VACC NO PRSV 0.5 ML IM: CPT

## 2021-11-08 PROCEDURE — T1015 CLINIC SERVICE: HCPCS | Performed by: FAMILY MEDICINE

## 2021-11-08 PROCEDURE — 90670 PCV13 VACCINE IM: CPT

## 2021-11-08 PROCEDURE — 99213 OFFICE O/P EST LOW 20 MIN: CPT | Performed by: FAMILY MEDICINE

## 2022-11-03 ENCOUNTER — HOSPITAL ENCOUNTER (EMERGENCY)
Facility: HOSPITAL | Age: 37
Discharge: HOME | End: 2022-11-03
Attending: EMERGENCY MEDICINE

## 2022-11-03 ENCOUNTER — TELEPHONE (OUTPATIENT)
Dept: FAMILY MEDICINE CLINIC | Facility: HOME HEALTHCARE | Age: 37
End: 2022-11-03

## 2022-11-03 ENCOUNTER — APPOINTMENT (OUTPATIENT)
Dept: NON INVASIVE DIAGNOSTICS | Facility: HOSPITAL | Age: 37
End: 2022-11-03

## 2022-11-03 ENCOUNTER — APPOINTMENT (EMERGENCY)
Dept: RADIOLOGY | Facility: HOSPITAL | Age: 37
End: 2022-11-03

## 2022-11-03 VITALS
DIASTOLIC BLOOD PRESSURE: 71 MMHG | OXYGEN SATURATION: 99 % | WEIGHT: 250 LBS | TEMPERATURE: 98.1 F | HEART RATE: 89 BPM | RESPIRATION RATE: 16 BRPM | SYSTOLIC BLOOD PRESSURE: 115 MMHG | BODY MASS INDEX: 42.91 KG/M2

## 2022-11-03 DIAGNOSIS — M25.561 RIGHT KNEE PAIN: Primary | ICD-10-CM

## 2022-11-03 RX ORDER — NAPROXEN 500 MG/1
500 TABLET ORAL 2 TIMES DAILY WITH MEALS
Qty: 30 TABLET | Refills: 0 | Status: SHIPPED | OUTPATIENT
Start: 2022-11-03

## 2022-11-03 RX ORDER — KETOROLAC TROMETHAMINE 30 MG/ML
30 INJECTION, SOLUTION INTRAMUSCULAR; INTRAVENOUS ONCE
Status: COMPLETED | OUTPATIENT
Start: 2022-11-03 | End: 2022-11-03

## 2022-11-03 RX ADMIN — KETOROLAC TROMETHAMINE 30 MG: 30 INJECTION, SOLUTION INTRAMUSCULAR at 18:37

## 2022-11-03 NOTE — DISCHARGE INSTRUCTIONS
Rest, ice, compression, elevation  Continue use of brace  Xray shows no acute findings  Venous duplex was negative for blood clot  Take anti-inflammatory as directed with food  Can supplement with OTC tylenol  Follow up with orthopedics for recheck or return to ER as needed

## 2022-11-03 NOTE — ED PROVIDER NOTES
History  Chief Complaint   Patient presents with   • Knee Pain     Patient states 2 weeks ago the arthritis in her right knee began so intense that it began to affect her ADLs  Her pcp is unable to get an appointment at this time  42year old female with PMH migraines, osteoarthritis presents ambulatory from home for evaluation of right knee pain  She reports this flared up about 2 weeks ago  Pt notes she has known arthritis  She reports this normally gets bad in the cold weather  She denies injury or trauma  No falls reported  She reports pain throughout her knee which radiates now into the back of the knee  She reports it feels swollen  Denies redness or warmth  Denies numbness, tingling  No falls reported  She reports pain worse with movement and weight bearing  She has been trying topical tiger balm, using a brace and took advil and aspirin without relief  She notes she has an appt with ortho but felt she could wait that long  She states she couldn't stand the pain while at work today prompting her visit  Denies fever, chills  Denies cough, congestion or recent illness  Denies chest pain, SOB, N/V/D, abdominal pain, back pain  No rashes or color change reported  She denies personal h/o DVT  Pt denies any recent imaging  She denies any past history of knee surgeries  History provided by:  Patient   used: No        Prior to Admission Medications   Prescriptions Last Dose Informant Patient Reported?  Taking?   aspirin-acetaminophen-caffeine (EXCEDRIN MIGRAINE) 250-250-65 MG per tablet   Yes No   Sig: Take 1 tablet by mouth every 6 (six) hours as needed for headaches   ibuprofen (MOTRIN) 600 mg tablet   No No   Sig: Take 1 tablet (600 mg total) by mouth every 6 (six) hours as needed (pain, fever (= 3 of the 200 mg OTC tablets))   topiramate (TOPAMAX) 25 mg sprinkle capsule   No No   Sig: take 1 capsule by mouth AT NIGHT FOR 3 NIGHTS, THEN INCREASE BY 1 CAPSULE EVERY THIRD NIGHT UNTIL 4 CAPSULES AT NIGHT      Facility-Administered Medications: None       Past Medical History:   Diagnosis Date   • Migraine    • Osteoarthritis        Past Surgical History:   Procedure Laterality Date   • CERVICAL BIOPSY N/A 2020    Procedure: BIOPSY CONE;  Surgeon: Liya Soto MD;  Location: [facility] MAIN OR;  Service: Gynecology   •  SECTION     • CT LAP,TUBAL CAUTERY Bilateral 2020    Procedure: LAPAROSCOPIC TUBAL COAGULATION;  Surgeon: Liya Soto MD;  Location: [facility] MAIN OR;  Service: Gynecology       Family History   Problem Relation Age of Onset   • Diabetes Mother      I have reviewed and agree with the history as documented  E-Cigarette/Vaping   • E-Cigarette Use Never User      E-Cigarette/Vaping Substances     Social History     Tobacco Use   • Smoking status: Current Every Day Smoker     Packs/day: 0 50     Types: Cigarettes   • Smokeless tobacco: Never Used   • Tobacco comment: every three days   Vaping Use   • Vaping Use: Never used   Substance Use Topics   • Alcohol use: No   • Drug use: Yes     Types: Marijuana       Review of Systems   Constitutional: Negative  Negative for chills, fatigue and fever  HENT: Negative  Negative for congestion, rhinorrhea and sore throat  Eyes: Negative  Respiratory: Negative  Negative for cough, shortness of breath and wheezing  Cardiovascular: Negative for chest pain, palpitations and leg swelling  Gastrointestinal: Negative  Negative for abdominal pain, constipation, diarrhea, nausea and vomiting  Genitourinary: Negative  Negative for dysuria and frequency  Musculoskeletal: Positive for arthralgias, gait problem and joint swelling  Negative for back pain, myalgias and neck pain  Skin: Negative  Negative for rash  Neurological: Negative for dizziness, light-headedness, numbness and headaches  Psychiatric/Behavioral: Negative  All other systems reviewed and are negative        Physical Exam  Physical Exam  Vitals and nursing note reviewed  Constitutional:       General: She is awake  She is not in acute distress  Appearance: She is well-developed and overweight  She is not toxic-appearing  HENT:      Head: Normocephalic and atraumatic  Right Ear: Hearing and external ear normal       Left Ear: Hearing and external ear normal       Nose: Nose normal       Mouth/Throat:      Mouth: Mucous membranes are moist       Tongue: Tongue does not deviate from midline  Pharynx: Oropharynx is clear  Uvula midline  Eyes:      General: Lids are normal  No scleral icterus  Conjunctiva/sclera: Conjunctivae normal       Pupils: Pupils are equal, round, and reactive to light  Neck:      Trachea: Trachea and phonation normal  No tracheal deviation  Cardiovascular:      Rate and Rhythm: Normal rate and regular rhythm  Pulses: Normal pulses  Radial pulses are 2+ on the right side and 2+ on the left side  Dorsalis pedis pulses are 2+ on the right side and 2+ on the left side  Posterior tibial pulses are 2+ on the right side and 2+ on the left side  Heart sounds: Normal heart sounds, S1 normal and S2 normal  No murmur heard  Pulmonary:      Effort: Pulmonary effort is normal  No tachypnea or respiratory distress  Breath sounds: Normal breath sounds  No wheezing, rhonchi or rales  Musculoskeletal:      Cervical back: Normal range of motion and neck supple  Right hip: Normal       Right knee: No deformity, erythema, ecchymosis or crepitus  Decreased range of motion  Tenderness present over the medial joint line  Normal alignment and normal patellar mobility  Normal pulse  Right lower leg: No edema  Left lower leg: No edema  Right ankle: Normal    Skin:     General: Skin is warm and dry  Capillary Refill: Capillary refill takes less than 2 seconds  Findings: No abrasion, bruising, erythema, rash or wound     Neurological: General: No focal deficit present  Mental Status: She is alert and oriented to person, place, and time  GCS: GCS eye subscore is 4  GCS verbal subscore is 5  GCS motor subscore is 6  Cranial Nerves: No cranial nerve deficit  Sensory: No sensory deficit  Motor: No abnormal muscle tone  Gait: Gait abnormal (limping)  Psychiatric:         Mood and Affect: Mood normal          Speech: Speech normal          Behavior: Behavior normal          Vital Signs  ED Triage Vitals [11/03/22 1725]   Temperature Pulse Respirations Blood Pressure SpO2   98 1 °F (36 7 °C) (!) 53 16 162/75 97 %      Temp Source Heart Rate Source Patient Position - Orthostatic VS BP Location FiO2 (%)   Temporal Monitor Sitting Right arm --      Pain Score       10 - Worst Possible Pain           Vitals:    11/03/22 1725 11/03/22 1730 11/03/22 1800   BP: 162/75 107/73 115/71   Pulse: (!) 53 88 89   Patient Position - Orthostatic VS: Sitting Sitting Lying         Visual Acuity      ED Medications  Medications   ketorolac (TORADOL) injection 30 mg (30 mg Intramuscular Given 11/3/22 1837)       Diagnostic Studies  Results Reviewed     None                 VAS lower limb venous duplex study, unilateral/limited   Final Result by Tristan Arce MD (11/03 1859)      XR knee 3 views right non injury    (Results Pending)              Procedures  Procedures         ED Course  ED Course as of 11/03/22 2050   Thu Nov 03, 2022   1804 XR knee 3 views right non injury  Independently viewed and interpreted by me - no fracture or acute osseous findings; pending official read  1829 Prelim from vascular tech, negative for DVT in RLE    1833 Pt updated on results  Xray shows no acute osseous findings  Venous duplex was negative for DVT  Likely acute flare of known OA  Pt was provided with a hinged knee brace  Pt neurovascularly intact post application  Reviewed RICE therapy    Continue OTC tylenol and Rx NSAID as needed for pain relief  Recommended f/u with orthopedics and pt already has appt scheduled  Strict return precautions outlined  Advised outpatient follow up with orthopedics or return to ER for change in condition as outlined  Pt verbalized understanding and had no further questions  MDM  Number of Diagnoses or Management Options  Right knee pain: new and requires workup     Amount and/or Complexity of Data Reviewed  Clinical lab tests: reviewed  Tests in the radiology section of CPT®: ordered and reviewed  Decide to obtain previous medical records or to obtain history from someone other than the patient: yes  Obtain history from someone other than the patient: yes  Review and summarize past medical records: yes  Independent visualization of images, tracings, or specimens: yes    Patient Progress  Patient progress: improved      Disposition  Final diagnoses:   Right knee pain     Time reflects when diagnosis was documented in both MDM as applicable and the Disposition within this note     Time User Action Codes Description Comment    11/3/2022  6:42 PM Coco Lamb Add [W42 [id number]] Right knee pain       ED Disposition     ED Disposition   Discharge    Condition   Stable    Date/Time   Thu Nov 3, 2022  6:42 PM    Comment   Flavia Rojasy discharge to home/self care                 Follow-up Information     Follow up With Specialties Details Why Contact Info Additional [address] [facility] Specialists [facility] Orthopedic Surgery Go to   [address] [facility] [phone]  [address] [place] [address] Specialists Arleen Ovalle [address] [facility], [facility], [place], [address]    [facility] Emergency Department Emergency Medicine  As needed [address] [phone]  [address] [place] [address] Emergency Department, [address], [address]          Discharge Medication List as of 11/3/2022  6:44 PM      START taking these medications    Details   naproxen (Naprosyn) 500 mg tablet Take 1 tablet (500 mg total) by mouth 2 (two) times a day with meals, Starting Thu 11/3/2022, Normal         CONTINUE these medications which have NOT CHANGED    Details   aspirin-acetaminophen-caffeine (EXCEDRIN MIGRAINE) 250-250-65 MG per tablet Take 1 tablet by mouth every 6 (six) hours as needed for headaches, Historical Med      ibuprofen (MOTRIN) 600 mg tablet Take 1 tablet (600 mg total) by mouth every 6 (six) hours as needed (pain, fever (= 3 of the 200 mg OTC tablets)), Starting Mon 4/27/2020, Normal      topiramate (TOPAMAX) 25 mg sprinkle capsule take 1 capsule by mouth AT NIGHT FOR 3 NIGHTS, THEN INCREASE BY 1 CAPSULE EVERY THIRD NIGHT UNTIL 4 CAPSULES AT NIGHT, Normal             No discharge procedures on file      PDMP Review     None          ED Provider  Electronically Signed by           Cece Barajas PA-C  11/03/22 2050

## 2022-11-09 ENCOUNTER — OFFICE VISIT (OUTPATIENT)
Dept: FAMILY MEDICINE CLINIC | Facility: CLINIC | Age: 37
End: 2022-11-09

## 2022-11-09 VITALS
HEART RATE: 72 BPM | WEIGHT: 249 LBS | RESPIRATION RATE: 18 BRPM | HEIGHT: 60 IN | BODY MASS INDEX: 48.88 KG/M2 | TEMPERATURE: 96.5 F | DIASTOLIC BLOOD PRESSURE: 72 MMHG | OXYGEN SATURATION: 98 % | SYSTOLIC BLOOD PRESSURE: 102 MMHG

## 2022-11-09 DIAGNOSIS — Z13.29 SCREENING FOR THYROID DISORDER: ICD-10-CM

## 2022-11-09 DIAGNOSIS — Z11.59 NEED FOR HEPATITIS C SCREENING TEST: ICD-10-CM

## 2022-11-09 DIAGNOSIS — Z12.4 SCREENING FOR CERVICAL CANCER: ICD-10-CM

## 2022-11-09 DIAGNOSIS — M25.561 ACUTE PAIN OF RIGHT KNEE: Primary | ICD-10-CM

## 2022-11-09 DIAGNOSIS — Z13.1 SCREENING FOR DIABETES MELLITUS (DM): ICD-10-CM

## 2022-11-09 DIAGNOSIS — Z13.220 SCREENING FOR LIPID DISORDERS: ICD-10-CM

## 2022-11-09 DIAGNOSIS — Z11.4 SCREENING FOR HIV (HUMAN IMMUNODEFICIENCY VIRUS): ICD-10-CM

## 2022-11-09 RX ORDER — PREDNISONE 20 MG/1
40 TABLET ORAL DAILY
Qty: 10 TABLET | Refills: 0 | Status: SHIPPED | OUTPATIENT
Start: 2022-11-09 | End: 2022-11-14

## 2022-11-09 NOTE — PROGRESS NOTES
Assessment/Plan:     Problem List Items Addressed This Visit    None     Visit Diagnoses     Acute pain of right knee    -  Primary    Relevant Medications    predniSONE 20 mg tablet    Other Relevant Orders    C-reactive protein    Sedimentation rate, automated    Ambulatory Referral to Orthopedic Surgery    Ambulatory Referral to Physical Therapy    Screening for HIV (human immunodeficiency virus)        Relevant Orders    HIV 1/2 Antigen/Antibody (4th Generation) w Reflex [facility]    Need for hepatitis C screening test        Relevant Orders    Hepatitis C antibody    Screening for cervical cancer        Relevant Orders    Ambulatory Referral to Gynecology    Screening for lipid disorders        Relevant Orders    Lipid Panel with Direct LDL reflex    Screening for thyroid disorder        Relevant Orders    TSH, 3rd generation with Free T4 reflex    Screening for diabetes mellitus (DM)        Relevant Orders    CBC and differential    HEMOGLOBIN A1C W/ EAG ESTIMATION             Due to mild knee swelling and consistent pain I'll trial patient on 5 days of prednisone 40 mg  Patient was given a referral for physical therapy and orthopedic surgery referral   Patient has had issues with this knee in the past  Patient otherwise given some labs to complete  Rest, ice, compression, elevation  He will follow-up for Pap smear next visit  Follow-up in 2 weeks  Subjective:      Patient ID: Geni Florentino is a 42 y o  female presents to office secondary to right knee pain  Patient was recently seen emergency room which indicated possible osteoarthritis of the knee  Patient was treated with toradol injection 30 mg  Right knee x-ray showed no acute osseous findings but there a question of small loose body deformity  Venous Doppler study was also negative for DVT  Patient currently with symptoms of right knee pain  Pain is  6/10 pain on range  Patient indicates the patient is sharp and is restricted range of motion  Patient denies fevers, chills, night sweats, chest pain or shortness of breath no other associated symptoms  Patient has a history of osteoarthritis and this is likely a flare up or a result of the body deformity  Otherwise patient has been using naproxen for the past 5 days 500 mg b i d   Patient indicates that symptoms still persist       The following portions of the patient's history were reviewed and updated as appropriate: allergies, current medications, past family history, past medical history, past social history, past surgical history and problem list     Review of Systems   Constitutional: Negative for chills and fever  HENT: Negative for ear pain and sore throat  Eyes: Negative for pain and visual disturbance  Respiratory: Negative for cough and shortness of breath  Cardiovascular: Negative for chest pain and palpitations  Gastrointestinal: Negative for abdominal pain and vomiting  Genitourinary: Negative for dysuria and hematuria  Musculoskeletal: Negative for arthralgias and back pain  Right knee pain    Skin: Negative for color change and rash  Neurological: Negative for seizures and syncope  All other systems reviewed and are negative  Objective:    /72   Pulse 72   Temp (!) 96 5 °F (35 8 °C)   Resp 18   Ht 5' (1 524 m)   Wt 113 kg (249 lb)   SpO2 98%   BMI 48 63 kg/m²        Physical Exam  Constitutional:       Appearance: Normal appearance  She is normal weight  HENT:      Head: Normocephalic and atraumatic  Right Ear: Tympanic membrane normal       Left Ear: Tympanic membrane normal    Cardiovascular:      Rate and Rhythm: Normal rate and regular rhythm  Pulses: Normal pulses  Heart sounds: Normal heart sounds  Pulmonary:      Effort: Pulmonary effort is normal       Breath sounds: Normal breath sounds  Abdominal:      General: Abdomen is flat  Bowel sounds are normal  There is no distension  Palpations: Abdomen is soft  There is no mass  Tenderness: There is no abdominal tenderness  Hernia: No hernia is present  Musculoskeletal:         General: Swelling and tenderness present  No signs of injury  Normal range of motion  Cervical back: Normal range of motion  Comments: mild swelling of the right knee  Tender to touch diffusely and along the joint lines  Restricted range of motion with flexion and extension     No evidence of fractures per x-ray  No evidence of erythema  Meriam Cambric evident  No evidence of drainage   Skin:     General: Skin is warm  Capillary Refill: Capillary refill takes less than 2 seconds  Coloration: Skin is jaundiced  Findings: No bruising, erythema, lesion or rash  Neurological:      General: No focal deficit present  Mental Status: She is alert and oriented to person, place, and time  Cranial Nerves: No cranial nerve deficit  Motor: No weakness     Psychiatric:         Mood and Affect: Mood normal          Behavior: Behavior normal

## 2022-11-15 ENCOUNTER — TELEPHONE (OUTPATIENT)
Dept: PAIN MEDICINE | Facility: MEDICAL CENTER | Age: 37
End: 2022-11-15

## 2022-11-15 NOTE — TELEPHONE ENCOUNTER
Caller: Patient    Doctor/Office: n/a    Caller asked to speak to: PT     Call was transferred to: PT

## 2022-12-08 ENCOUNTER — EVALUATION (OUTPATIENT)
Dept: PHYSICAL THERAPY | Facility: HOME HEALTHCARE | Age: 37
End: 2022-12-08

## 2022-12-08 DIAGNOSIS — M25.561 ACUTE PAIN OF RIGHT KNEE: Primary | ICD-10-CM

## 2022-12-08 NOTE — PROGRESS NOTES
PT Evaluation     Today's date: 2022  Patient name: Gus Burnham  : 1985  MRN: [id number]  Referring provider: Anton Atkinson DO  Dx:   Encounter Diagnosis     ICD-10-CM    1  Acute pain of right knee  M25 [id number] Ambulatory Referral to Physical Therapy          Start Time: 1540  Stop Time: 1615  Total time in clinic (min): 35 minutes    Assessment  Assessment details: Pt is a 41 y/o female presenting with acute R anterior, medial, lateral, and posterior knee pain consistent with diagnosis of plausible R patellofemoral pain syndrome  Pt is presenting with significant pain levels, ROM deficits, strength deficits, and decreased functional mobility, which is affecting her ability to perform full ADLs  Pt require skilled PT in order to improve in pain, strength, ROM, functional mobility, quality of life, and return to PLOF  Thank you! Impairments: abnormal gait, abnormal or restricted ROM, abnormal movement, activity intolerance, impaired physical strength, lacks appropriate home exercise program, pain with function, weight-bearing intolerance and poor body mechanics  Understanding of Dx/Px/POC: good   Prognosis: good    Goals  STG: 3-4 weeks  Pt will be independent with HEP in order to continue to progress outside of PT treatment sessions  Pt will improve in quality of life as demonstrated by worst pain levels of 5/10 or less  Pt will improve in functional mobility as demonstrated by R knee ROM of 0-90 deg or greater  LT-8 weeks  Pt will improve in quality of life as demonstrated by worst pain levels of 2/10 or less  Pt will improve in functional mobility as demonstrated by strength levels of 4+/5 or greater  Pt will improve in functional mobility as demonstrated by R knee ROM WFL  Pt will improve in functional mobility as demonstrated by ability to perform full ADLs without any limitations due to pain      Plan  Patient would benefit from: skilled physical therapy  Planned modality interventions: cryotherapy and thermotherapy: hydrocollator packs  Planned therapy interventions: body mechanics training, flexibility, functional ROM exercises, gait training, home exercise program, joint mobilization, manual therapy, neuromuscular re-education, patient education, postural training, strengthening, stretching, therapeutic activities and therapeutic exercise  Frequency: 2x week  Duration in weeks: 4  Plan of Care beginning date: 2022  Plan of Care expiration date: 2023  Treatment plan discussed with: patient        Subjective Evaluation    History of Present Illness  Mechanism of injury: Pt is presenting to OPPT with acute R anterior knee pain which started approximately 4 weeks ago  Pt also reports that most of the pain is in her calf and the front of her knee/shin  Pt reports that she has OA and that she thinks it is likely contributing to her pain  Pt reports that she is employed at [employer], which requires a lot of walking, which exacerbates her symptoms  Pt reports that her knee swells up intermittently  Pt denies any numbness or tingling  Pt does report some popping in her knee cap    Pain  Current pain ratin  At best pain ratin  At worst pain rating: 10  Quality: sharp (Bones grinding)  Relieving factors: relaxation, support and rest  Aggravating factors: standing, walking and stair climbing    Social Support  Steps to enter house: no  Stairs in house: yes (2 flights)       Diagnostic Tests  X-ray: normal  Treatments  Current treatment: injection treatment  Patient Goals  Patient goals for therapy: decreased pain, increased motion, increased strength, independence with ADLs/IADLs and return to sport/leisure activities  Patient goal: To get rid of the pain        Objective     Observations     Additional Observation Details  Bilateral knee valgus    Tenderness     Right Knee   Tenderness in the inferior patella, lateral joint line, lateral patella, medial joint line, medial patella and superior patella  Neurological Testing     Sensation     Knee   Left Knee   Intact: light touch    Right Knee   Intact: light touch     Passive Range of Motion   Left Hip   Flexion: 95 degrees     Right Hip   Flexion: 90 degrees   Left Knee   Flexion: WFL  Extension: WFL    Right Knee   Flexion: 75 degrees with pain  Extension: 0 degrees with pain    Strength/Myotome Testing     Left Hip   Planes of Motion   Flexion: 4+  Abduction: 4  Adduction: 4+    Right Hip   Planes of Motion   Flexion: 3+  Abduction: 3+  Adduction: 4+    Left Knee   Flexion: 5  Extension: 5    Right Knee   Flexion: 3+  Extension: 3+    Left Ankle/Foot   Dorsiflexion: 5    Right Ankle/Foot   Dorsiflexion: 4+    Tests     Right Knee   Positive patellar compression and patella-femoral grind  Ambulation     Ambulation: Level Surfaces   Ambulation without assistive device: independent    Ambulation: Stairs   Ascend stairs: independent  Pattern: non-reciprocal  Railings: one rail  Descend stairs: independent  Pattern: non-reciprocal  Railings: one rail  Curbs: independent    Observational Gait   Gait: antalgic   Decreased walking speed, stride length and right stance time  Left foot contact pattern: foot flat  Right foot contact pattern: foot flat    Quality of Movement During Gait     Knee    Knee (Left): Positive valgus  Knee (Right): Positive valgus                Precautions: None    Re-eval Date: 1/8/2023      Manuals 12/8            R knee PROM 8'            Dharmesh calf, hs, glute stretch                                       Neuro Re-Ed             Balance as appropriate                                                    Ther Ex             Nustep             Quad set HEP            LAQ HEP            Hamstring curls             SLR HEP            Bridges HEP            Clamshells             S/L hip abd HEP            Standing hip flex/ext/abd             Squat             Leg press                          Ther Activity Step up/down                          Gait Training                                       Modalities             CP

## 2022-12-12 ENCOUNTER — OFFICE VISIT (OUTPATIENT)
Dept: PHYSICAL THERAPY | Facility: HOME HEALTHCARE | Age: 37
End: 2022-12-12

## 2022-12-12 DIAGNOSIS — M25.561 ACUTE PAIN OF RIGHT KNEE: Primary | ICD-10-CM

## 2022-12-12 NOTE — PROGRESS NOTES
Daily Note     Today's date: 2022  Patient name: Iglesia Thapa  : 1985  MRN: [id number]  Referring provider: Judah Steven DO  Dx: No diagnosis found  Subjective: I have a lot of R knee pain  I just got off of work and the walking all day really irritates my knee  I have a difficult time falling asleep because of pain and trying to find a comfortable position  Pain of almost 10/10 right now  Objective: See treatment diary below    Assessment: Tolerated treatment fair  Reviewed entire HEP and did not add new TE 2* pt with pain at R knee with the HEP  Provided gentle B LE calf & HS stretch with good prashant and with pt report of reduced tightness  Pt reports some mild pain relief at end of Tx with MHP to B knees  Encouraged consistency with HEP as daily ability allows  Patient would benefit from continued PT  Plan: Continue per plan of care        Precautions: None    Re-eval Date: 2023      Manuals  12-12           R knee PROM 8' 2'           Dharmesh calf, hs, glute stretch  10'                                     Neuro Re-Ed             Balance as appropriate                                                    Ther Ex  12-12           Nustep             Quad set HEP 5" x10           LAQ HEP 5" x10           Hamstring curls             SLR HEP 5" x10           Bridges HEP 1x10           Clamshells             S/L hip abd HEP 1x10           Standing hip flex/ext/abd             Squat             Leg press                          Ther Activity             Step up/down                          Gait Training                                       Modalities  12-12           CP             MHP  Supine w/roll under knees  10'

## 2022-12-19 ENCOUNTER — OFFICE VISIT (OUTPATIENT)
Dept: PHYSICAL THERAPY | Facility: HOME HEALTHCARE | Age: 37
End: 2022-12-19

## 2022-12-19 DIAGNOSIS — M25.561 ACUTE PAIN OF RIGHT KNEE: Primary | ICD-10-CM

## 2022-12-19 NOTE — PROGRESS NOTES
Daily Note     Today's date: 2022  Patient name: Unique Palomo  : 1985  MRN: [id number]  Referring provider: Demetria Zamora DO  Dx: No diagnosis found  Start Time:           Subjective: Pain of 9/10 at R knee today  I just got off of work which really irritates my knee  Objective: See treatment diary below    Assessment: Tolerated treatment fair  Pt was able to prashant added NuStep and R LE flex/abd today but declined further advancements with ex 2* average knee pain of 9/10  Pt with strength, endurance and ROM deficits noted at R knee  Pt reports pain reduced 3 levels at end of tx with MT and MHP  Patient would benefit from continued PT    Plan: Continue per plan of care        Precautions: None    Re-eval Date: 2023      Manuals           R knee PROM 8' 2' 2'          Dharmesh calf, hs, glute stretch  10' 10'                                    Neuro Re-Ed             Balance as appropriate                                                    Ther Ex            Nustep   L1  10'          Quad set HEP 5" x10 5" x10          LAQ HEP 5" x10 5" x10          Hamstring curls             SLR HEP 5" x10 5" x10          Bridges HEP 1x10 1x10          Clamshells             S/L hip abd HEP 1x10 1x10          Standing hip flex/ext/abd   R LE only  Flex/abd  1x10 ea          Squat   NV          Leg press                          Ther Activity             Step up/down                          Gait Training                                       Modalities            CP             MHP  Supine w/roll under knees  10' Supine w/roll under knees  10'

## 2022-12-22 ENCOUNTER — APPOINTMENT (OUTPATIENT)
Dept: PHYSICAL THERAPY | Facility: HOME HEALTHCARE | Age: 37
End: 2022-12-22

## 2022-12-29 ENCOUNTER — OFFICE VISIT (OUTPATIENT)
Dept: PHYSICAL THERAPY | Facility: HOME HEALTHCARE | Age: 37
End: 2022-12-29

## 2022-12-29 DIAGNOSIS — M25.561 ACUTE PAIN OF RIGHT KNEE: Primary | ICD-10-CM

## 2022-12-29 NOTE — PROGRESS NOTES
Daily Note     Today's date: 2022  Patient name: Rolanda Morales  : 1985  MRN: [id number]  Referring provider: Neha Black DO  Dx:   Encounter Diagnosis     ICD-10-CM    1  Acute pain of right knee  M25 561                  Subjective: Pt reports she doesn't have any pain right now  Objective: See treatment diary below      Assessment: Tolerated treatment fairly well  Some verbal cues needed to perform exercises correctly  Progressed to squats and increased reps with most seated and supine exercises today  Pt notes some soreness/pain  R knee with exercise  Patient would benefit from continued PT      Plan: Continue per plan of care        Precautions: None    Re-eval Date: 2023      Manuals        R knee PROM 8' 2' 2' 2'       Dharmesh calf, hs, glute stretch  10' 10' 10'                             Neuro Re-Ed           Balance as appropriate                                            Ther Ex          Nustep   L1  10' L1  10'       Quad set HEP 5" x10 5" x10 5"x15       LAQ HEP 5" x10 5" x10 5"x15       Hamstring curls           SLR HEP 5" x10 5" x10 5"x15       Bridges HEP 1x10 1x10 1x15       Clamshells           S/L hip abd HEP 1x10 1x10 1x10       Standing hip flex/ext/abd   R LE only  Flex/abd  1x10 ea R LE only   1x10 ea        Squat   NV 1x10       Leg press                      Ther Activity           Step up/down                      Gait Training                                 Modalities          CP           MHP  Supine w/roll under knees  10' Supine w/roll under knees  10' Supine w/roll under knees  10'

## 2023-01-05 ENCOUNTER — OFFICE VISIT (OUTPATIENT)
Dept: PHYSICAL THERAPY | Facility: HOME HEALTHCARE | Age: 38
End: 2023-01-05

## 2023-01-05 DIAGNOSIS — M25.561 ACUTE PAIN OF RIGHT KNEE: Primary | ICD-10-CM

## 2023-01-05 NOTE — PROGRESS NOTES
Daily Note     Today's date: 2023  Patient name: Lincoln Paiz  : 1985  MRN: [id number]  Referring provider: Berlin Mena DO  Dx:   Encounter Diagnosis     ICD-10-CM    1  Acute pain of right knee  M25 561                      Subjective: Pt reports she just got off work and has pain in her R knee  Objective: See treatment diary below      Assessment: Tolerated treatment fair  Verbal cues needed t/o exercise to perform correctly  Pt reports increased pain R knee t/o exercises  Patient would benefit from continued PT to improve ROM, strength and overall functional mobility  Plan: Continue per plan of care        Precautions: None    Re-eval Date: 2023      Manuals       R knee PROM 8' 2' 2' 2' 2'       Dharmesh calf, hs, glute stretch  10' 10' 10' 10'                             Neuro Re-Ed           Balance as appropriate                                            Ther Ex          Nustep   L1  10' L1  10' L1  10'      Quad set HEP 5" x10 5" x10 5"x15 5" x 15       LAQ HEP 5" x10 5" x10 5"x15 5" x 15      Hamstring curls           SLR HEP 5" x10 5" x10 5"x15 5"x15      Bridges HEP 1x10 1x10 1x15 1x15       Clamshells           S/L hip abd HEP 1x10 1x10 1x10 1x10      Standing hip flex/ext/abd   R LE only  Flex/abd  1x10 ea R LE only   1x10 ea  R LE only   1x10 ea       Squat   NV 1x10 1x10       Leg press                      Ther Activity           Step up/down                      Gait Training                                 Modalities          CP           MHP  Supine w/roll under knees  10' Supine w/roll under knees  10' Supine w/roll under knees  10' Supine w/roll under knees 10'

## 2023-01-09 ENCOUNTER — EVALUATION (OUTPATIENT)
Dept: PHYSICAL THERAPY | Facility: HOME HEALTHCARE | Age: 38
End: 2023-01-09

## 2023-01-09 DIAGNOSIS — M25.561 ACUTE PAIN OF RIGHT KNEE: Primary | ICD-10-CM

## 2023-01-09 NOTE — PROGRESS NOTES
Daily Note     Today's date: 2023  Patient name: Kiran Malik  : 1985  MRN: [id number]  Referring provider: Pineda Winn DO  Dx: No diagnosis found  Start Time:           Subjective: I am tired from being on my feet all day at work  Objective: See treatment diary below    Assessment: Tolerated treatment well  Pt remains with limitations, but is improving slowly with prashant to TE and exhibits increases in pain free ROM at R kne since [facility]    Patient would benefit from continued PT    Plan: Continue per plan of care  See RE for full findings       Precautions: None    Re-eval Date: 2023      Manuals  1-9     R knee PROM 8' 2' 2' 2' 2'  2'     Dharmesh calf, hs, glute stretch  10' 10' 10' 10'  10'                           Neuro Re-Ed      1-9     Balance as appropriate                                            Ther Ex     1-9     Nustep   L1  10' L1  10' L1  10' L1  10'     Quad set HEP 5" x10 5" x10 5"x15 5" x 15  5" x 15     LAQ HEP 5" x10 5" x10 5"x15 5" x 15 5" x15     Hamstring curls           SLR HEP 5" x10 5" x10 5"x15 5"x15 5" x15     Bridges HEP 1x10 1x10 1x15 1x15  1x15     Clamshells           S/L hip abd HEP 1x10 1x10 1x10 1x10 1x10     Standing hip flex/ext/abd   R LE only  Flex/abd  1x10 ea R LE only   1x10 ea  R LE only   1x10 ea  R LE only  1x10 ea     Squat   NV 1x10 1x10  1x10     Leg press                      Ther Activity           Step up/down                      Gait Training                                 Modalities     1-9     CP           MHP  Supine w/roll under knees  10' Supine w/roll under knees  10' Supine w/roll under knees  10' Supine w/roll under knees 10' Supine w/roll  Under knees  10'

## 2023-01-09 NOTE — PROGRESS NOTES
PT Re-Evaluation     Today's date: 2023  Patient name: Ashutosh Cevallos  : 1985  MRN: [id number]  Referring provider: Albina Skaggs DO  Dx:   Encounter Diagnosis     ICD-10-CM    1  Acute pain of right knee  M25 561           Start Time: 1655          Assessment  Assessment details: Pt has made improvements in R knee pain, strength, ROM, and overall mobility since beginning PT  She has met her short-term goals for independence with HEP and ROM, however, she is still progressing towards her long-term goals for pain, strength, ROM, and overall functional mobility  Pt would benefit from continued PT in order to further improve in pain, strength, ROM, functional mobility, quality of life, and return to PLOF  Thank you! Impairments: abnormal gait, abnormal or restricted ROM, abnormal movement, activity intolerance, impaired physical strength, lacks appropriate home exercise program, pain with function, weight-bearing intolerance and poor body mechanics  Understanding of Dx/Px/POC: good   Prognosis: good    Goals  STG: 3-4 weeks  Pt will be independent with HEP in order to continue to progress outside of PT treatment sessions  Met  Pt will improve in quality of life as demonstrated by worst pain levels of 5/10 or less  Progressing  Pt will improve in functional mobility as demonstrated by R knee ROM of 0-90 deg or greater  Met  LT-8 weeks  Pt will improve in quality of life as demonstrated by worst pain levels of 2/10 or less  Pt will improve in functional mobility as demonstrated by strength levels of 4+/5 or greater  Progressing  Pt will improve in functional mobility as demonstrated by R knee ROM WFL  Progressing  Pt will improve in functional mobility as demonstrated by ability to perform full ADLs without any limitations due to pain      Plan  Patient would benefit from: skilled physical therapy  Planned modality interventions: cryotherapy and thermotherapy: hydrocollator packs  Planned therapy interventions: body mechanics training, flexibility, functional ROM exercises, gait training, home exercise program, joint mobilization, manual therapy, neuromuscular re-education, patient education, postural training, strengthening, stretching, therapeutic activities and therapeutic exercise  Frequency: 2x week  Duration in weeks: 4  Plan of Care beginning date: 2023  Plan of Care expiration date: 2023  Treatment plan discussed with: patient        Subjective Evaluation    History of Present Illness  Mechanism of injury: Pt is presenting to OPPT with acute R anterior knee pain which started approximately 4 weeks ago  Pt also reports that most of the pain is in her calf and the front of her knee/shin  Pt reports that she has OA and that she thinks it is likely contributing to her pain  Pt reports that she is employed at [employer], which requires a lot of walking, which exacerbates her symptoms  Pt reports that her knee swells up intermittently  Pt denies any numbness or tingling  Pt does report some popping in her knee cap  UPDATE 2023:  Pt reports that her knee is a little better than it was when she started PT  She reports that she does still experience pain in her knee at night and if she is on her feet a lot throughout the day    Pain  Current pain ratin  At best pain ratin  At worst pain ratin  Quality: sharp (Bones grinding)  Relieving factors: relaxation, support and rest  Aggravating factors: standing, walking and stair climbing    Social Support  Steps to enter house: no  Stairs in house: yes (2 flights)       Diagnostic Tests  X-ray: normal  Treatments  Current treatment: injection treatment  Patient Goals  Patient goals for therapy: decreased pain, increased motion, increased strength, independence with ADLs/IADLs and return to sport/leisure activities  Patient goal: To get rid of the pain        Objective     Observations     Additional Observation Details  Bilateral knee valgus    Tenderness     Right Knee   Tenderness in the inferior patella, lateral joint line, lateral patella, medial joint line, medial patella and superior patella  Neurological Testing     Sensation     Knee   Left Knee   Intact: light touch    Right Knee   Intact: light touch     Passive Range of Motion   Left Hip   Flexion: 95 degrees     Right Hip   Flexion: 90 degrees   Left Knee   Flexion: WFL  Extension: WFL    Right Knee   Flexion: 112 degrees   Extension: 0 degrees     Strength/Myotome Testing     Left Hip   Planes of Motion   Flexion: 4+  Abduction: 4  Adduction: 4+    Right Hip   Planes of Motion   Flexion: 4  Abduction: 4  Adduction: 4+    Left Knee   Flexion: 5  Extension: 5    Right Knee   Flexion: 4-  Extension: 4+    Left Ankle/Foot   Dorsiflexion: 5    Right Ankle/Foot   Dorsiflexion: 4+    Tests     Right Knee   Positive patellar compression and patella-femoral grind  Ambulation     Ambulation: Level Surfaces   Ambulation without assistive device: independent    Ambulation: Stairs   Ascend stairs: independent  Pattern: non-reciprocal  Railings: one rail  Descend stairs: independent  Pattern: non-reciprocal  Railings: one rail  Curbs: independent    Observational Gait   Gait: antalgic   Decreased walking speed, stride length and right stance time  Left foot contact pattern: foot flat  Right foot contact pattern: foot flat    Quality of Movement During Gait     Knee    Knee (Left): Positive valgus  Knee (Right): Positive valgus                Precautions: None    Re-eval Date: 1/8/2023

## 2023-01-12 ENCOUNTER — APPOINTMENT (OUTPATIENT)
Dept: PHYSICAL THERAPY | Facility: HOME HEALTHCARE | Age: 38
End: 2023-01-12

## 2023-01-19 ENCOUNTER — APPOINTMENT (OUTPATIENT)
Dept: PHYSICAL THERAPY | Facility: HOME HEALTHCARE | Age: 38
End: 2023-01-19

## 2023-01-23 ENCOUNTER — OFFICE VISIT (OUTPATIENT)
Dept: PHYSICAL THERAPY | Facility: HOME HEALTHCARE | Age: 38
End: 2023-01-23

## 2023-01-23 DIAGNOSIS — M25.561 ACUTE PAIN OF RIGHT KNEE: Primary | ICD-10-CM

## 2023-01-23 NOTE — PROGRESS NOTES
Daily Note     Today's date: 2023  Patient name: Karma Rios  : 1985  MRN: [id number]  Referring provider: Reva Cotto DO  Dx:   Encounter Diagnosis     ICD-10-CM    1  Acute pain of right knee  M25 561                  Subjective: Pt reports she has no pain right now  Objective: See treatment diary below      Assessment: Tolerated treatment fairly well  Pt reports some  pain R knee t/o exercise  Patient would benefit from continued PT to improve ROM, strength and overall functional mobility  Plan: Continue per plan of care        Precautions: None    Re-eval Date: 2023      Manuals -    R knee PROM 8' 2' 2' 2' 2'  2' 2'    Dharmesh calf, hs, glute stretch  10' 10' 10' 10'  10' 10'                          Neuro Re-Ed      1-9     Balance as appropriate                                            Ther Ex     1-9     Nustep   L1  10' L1  10' L1  10' L1  10' L1  10'    Quad set HEP 5" x10 5" x10 5"x15 5" x 15  5" x 15 5"x15    LAQ HEP 5" x10 5" x10 5"x15 5" x 15 5" x15 5"x 15    Hamstring curls           SLR HEP 5" x10 5" x10 5"x15 5"x15 5" x15 5"x15    Bridges HEP 1x10 1x10 1x15 1x15  1x15 1x15     Clamshells           S/L hip abd HEP 1x10 1x10 1x10 1x10 1x10     Standing hip flex/ext/abd   R LE only  Flex/abd  1x10 ea R LE only   1x10 ea  R LE only   1x10 ea  R LE only  1x10 ea R LE   1x10 ea     Squat   NV 1x10 1x10  1x10 1x10    Leg press                      Ther Activity           Step up/down                      Gait Training                                 Modalities     1-9     CP           MHP  Supine w/roll under knees  10' Supine w/roll under knees  10' Supine w/roll under knees  10' Supine w/roll under knees 10' Supine w/roll  Under knees  10' Pt declined

## 2023-01-26 ENCOUNTER — OFFICE VISIT (OUTPATIENT)
Dept: PHYSICAL THERAPY | Facility: HOME HEALTHCARE | Age: 38
End: 2023-01-26

## 2023-01-26 DIAGNOSIS — M25.561 ACUTE PAIN OF RIGHT KNEE: Primary | ICD-10-CM

## 2023-01-26 NOTE — PROGRESS NOTES
Pt came into PT dept for appt but had to leave to get her niece  Pt reported she could not stay for PT appt today

## 2023-01-30 ENCOUNTER — OFFICE VISIT (OUTPATIENT)
Dept: PHYSICAL THERAPY | Facility: HOME HEALTHCARE | Age: 38
End: 2023-01-30

## 2023-01-30 DIAGNOSIS — M25.561 ACUTE PAIN OF RIGHT KNEE: Primary | ICD-10-CM

## 2023-01-30 NOTE — PROGRESS NOTES
Daily Note     Today's date: 2023  Patient name: Harmeet Sosa  : 1985  MRN: [id number]  Referring provider: Selam Tracy DO  Dx:   Encounter Diagnosis     ICD-10-CM    1  Acute pain of right knee  M25 561                      Subjective:  Pt reports no pain R knee  Objective: See treatment diary below      Assessment: Tolerated treatment well  Progressed to step ups and hamstring curls with theraband this session  Minimal pain noted R knee t/o session  Patient would benefit from continued PT      Plan: Continue per plan of care        Precautions: None    Re-eval Date: 2023      Manuals -   R knee PROM 8' 2' 2' 2' 2'  2' 2' 2'   Dharmesh calf, hs, glute stretch  10' 10' 10' 10'  10' 10' 10'                         Neuro Re-Ed      1-9     Balance as appropriate                                            Ther Ex     1-9     Nustep   L1  10' L1  10' L1  10' L1  10' L1  10' L1  12'   Quad set HEP 5" x10 5" x10 5"x15 5" x 15  5" x 15 5"x15 5"x15   LAQ HEP 5" x10 5" x10 5"x15 5" x 15 5" x15 5"x 15 5" x 15   Hamstring curls        Red 1x15    SLR HEP 5" x10 5" x10 5"x15 5"x15 5" x15 5"x15 5' x 15    Bridges HEP 1x10 1x10 1x15 1x15  1x15 1x15  1x 15    Clamshells           S/L hip abd HEP 1x10 1x10 1x10 1x10 1x10     Standing hip flex/ext/abd   R LE only  Flex/abd  1x10 ea R LE only   1x10 ea  R LE only   1x10 ea  R LE only  1x10 ea R LE   1x10 ea  R LE    1x15 ea    Squat   NV 1x10 1x10  1x10 1x10 1x15    Leg press                      Ther Activity           Step up/down        Fwd C   1x15               Gait Training                                 Modalities     1-9     CP           MHP  Supine w/roll under knees  10' Supine w/roll under knees  10' Supine w/roll under knees  10' Supine w/roll under knees 10' Supine w/roll  Under knees  10' Pt declined  Pt declined

## 2023-02-02 ENCOUNTER — APPOINTMENT (OUTPATIENT)
Dept: PHYSICAL THERAPY | Facility: HOME HEALTHCARE | Age: 38
End: 2023-02-02

## 2023-02-06 ENCOUNTER — OFFICE VISIT (OUTPATIENT)
Dept: PHYSICAL THERAPY | Facility: HOME HEALTHCARE | Age: 38
End: 2023-02-06

## 2023-02-06 DIAGNOSIS — M25.561 ACUTE PAIN OF RIGHT KNEE: Primary | ICD-10-CM

## 2023-02-06 NOTE — PROGRESS NOTES
Daily Note     Today's date: 2023  Patient name: Unique Palomo  : 1985  MRN: [id number]  Referring provider: Demetria Zamora DO  Dx:   Encounter Diagnosis     ICD-10-CM    1  Acute pain of right knee  M25 561                      Subjective: Pt reports no pain R knee  Objective: See treatment diary below      Assessment: Tolerated treatment well  No pain reported R knee t/o session  Some verbal cues needed to perform exercises correctly  Patient would benefit from continued PT to improve ROM, strength and overall functional mobility  Plan: Continue per plan of care        Precautions: None    Re-eval Date: 2023      Manuals -   R knee PROM 2' 2' 2' 2' 2'  2' 2' 2'   Dharmesh calf, hs, glute stretch 10' 10' 10' 10' 10'  10' 10' 10'                         Neuro Re-Ed      1-9     Balance as appropriate                                            Ther Ex     1-9     Nustep L1 10'  L1  10' L1  10' L1  10' L1  10' L1  10' L1  12'   Quad set  5" x10 5" x10 5"x15 5" x 15  5" x 15 5"x15 5"x15   LAQ 5"x15 5" x10 5" x10 5"x15 5" x 15 5" x15 5"x 15 5" x 15   Hamstring curls Red 1x15        Red 1x15    SLR 5"x15 5" x10 5" x10 5"x15 5"x15 5" x15 5"x15 5' x 15    Bridges 1x15  1x10 1x10 1x15 1x15  1x15 1x15  1x 15    Clamshells           S/L hip abd  1x10 1x10 1x10 1x10 1x10     Standing hip flex/ext/abd R LE  1x15 ea   R LE only  Flex/abd  1x10 ea R LE only   1x10 ea  R LE only   1x10 ea  R LE only  1x10 ea R LE   1x10 ea  R LE    1x15 ea    Squat 1x15   NV 1x10 1x10  1x10 1x10 1x15    Leg press                      Ther Activity           Step up/down Fwd C   1x15       Fwd C   1x15               Gait Training                                 Modalities     1-9     CP           MHP Pt declined  Supine w/roll under knees  10' Supine w/roll under knees  10' Supine w/roll under knees  10' Supine w/roll under knees 10' Supine w/roll  Under knees  10' Pt declined  Pt declined

## 2023-02-09 ENCOUNTER — APPOINTMENT (OUTPATIENT)
Dept: PHYSICAL THERAPY | Facility: HOME HEALTHCARE | Age: 38
End: 2023-02-09

## 2023-02-16 ENCOUNTER — APPOINTMENT (OUTPATIENT)
Dept: PHYSICAL THERAPY | Facility: HOME HEALTHCARE | Age: 38
End: 2023-02-16

## 2023-02-20 ENCOUNTER — EVALUATION (OUTPATIENT)
Dept: PHYSICAL THERAPY | Facility: HOME HEALTHCARE | Age: 38
End: 2023-02-20

## 2023-02-20 DIAGNOSIS — M25.561 ACUTE PAIN OF RIGHT KNEE: Primary | ICD-10-CM

## 2023-02-20 NOTE — PROGRESS NOTES
PT Re-Evaluation     Today's date: 2023  Patient name: Dedrick Hdz  : 1985  MRN: [id number]  Referring provider: Celina Woody DO  Dx:   Encounter Diagnosis     ICD-10-CM    1  Acute pain of right knee  M25 561           Start Time: 1655          Assessment  Assessment details: Pt has made improvements in R knee pain, strength, ROM, and overall mobility since beginning PT  She has met her short-term goals for independence with HEP, pain, and ROM, however, she is still progressing towards her long-term goals for pain, strength, ROM, and overall functional mobility  Pt would benefit from continued PT in order to further improve in pain, strength, ROM, functional mobility, quality of life, and return to PLOF  Thank you! Impairments: abnormal gait, abnormal or restricted ROM, abnormal movement, activity intolerance, impaired physical strength, lacks appropriate home exercise program, pain with function, weight-bearing intolerance and poor body mechanics  Understanding of Dx/Px/POC: good   Prognosis: good    Goals  STG: 3-4 weeks  Pt will be independent with HEP in order to continue to progress outside of PT treatment sessions  Met  Pt will improve in quality of life as demonstrated by worst pain levels of 5/10 or less  Met  Pt will improve in functional mobility as demonstrated by R knee ROM of 0-90 deg or greater  Met  LT-8 weeks  Pt will improve in quality of life as demonstrated by worst pain levels of 2/10 or less  Progressing  Pt will improve in functional mobility as demonstrated by strength levels of 4+/5 or greater  Progressing  Pt will improve in functional mobility as demonstrated by R knee ROM WFL  Progressing  Pt will improve in functional mobility as demonstrated by ability to perform full ADLs without any limitations due to pain   Progressing    Plan  Patient would benefit from: skilled physical therapy  Planned modality interventions: cryotherapy and thermotherapy: hydrocollator packs  Planned therapy interventions: body mechanics training, flexibility, functional ROM exercises, gait training, home exercise program, joint mobilization, manual therapy, neuromuscular re-education, patient education, postural training, strengthening, stretching, therapeutic activities and therapeutic exercise  Frequency: 2x week  Duration in weeks: 2  Plan of Care beginning date: 2023  Plan of Care expiration date: 3/6/2023  Treatment plan discussed with: patient        Subjective Evaluation    History of Present Illness  Mechanism of injury: Pt is presenting to OPPT with acute R anterior knee pain which started approximately 4 weeks ago  Pt also reports that most of the pain is in her calf and the front of her knee/shin  Pt reports that she has OA and that she thinks it is likely contributing to her pain  Pt reports that she is employed at [employer], which requires a lot of walking, which exacerbates her symptoms  Pt reports that her knee swells up intermittently  Pt denies any numbness or tingling  Pt does report some popping in her knee cap  UPDATE 2023:  Pt reports that her knee is a little better than it was when she started PT  She reports that she does still experience pain in her knee at night and if she is on her feet a lot throughout the day  UPDATE 2023:  Pt reports that the PT has really been helping her pain levels and that her knee only gets up to about a 4/10 now  She reports that she is no longer needing to take her pain medication anymore  She does report that she feels like she would benefit from a few more weeks of PT    Pain  Current pain ratin  At best pain ratin  At worst pain ratin  Quality: sharp (Bones grinding)  Relieving factors: relaxation, support and rest  Aggravating factors: standing, walking and stair climbing    Social Support  Steps to enter house: no  Stairs in house: yes (2 flights)       Diagnostic Tests  X-ray: normal  Treatments  Current treatment: injection treatment  Patient Goals  Patient goals for therapy: decreased pain, increased motion, increased strength, independence with ADLs/IADLs and return to sport/leisure activities  Patient goal: To get rid of the pain        Objective     Observations     Additional Observation Details  Bilateral knee valgus    Tenderness     Right Knee   Tenderness in the inferior patella, lateral patella, medial joint line, medial patella and superior patella  Neurological Testing     Sensation     Knee   Left Knee   Intact: light touch    Right Knee   Intact: light touch     Passive Range of Motion   Left Hip   Flexion: 95 degrees     Right Hip   Flexion: 90 degrees   Left Knee   Flexion: WFL  Extension: WFL    Right Knee   Flexion: 117 degrees   Extension: 0 degrees     Strength/Myotome Testing     Left Hip   Planes of Motion   Flexion: 4+  Abduction: 4  Adduction: 4+    Right Hip   Planes of Motion   Flexion: 4  Abduction: 4  Adduction: 4+    Left Knee   Flexion: 5  Extension: 5    Right Knee   Flexion: 4  Extension: 4+    Left Ankle/Foot   Dorsiflexion: 5    Right Ankle/Foot   Dorsiflexion: 4+    Tests     Right Knee   Positive patella-femoral grind  Negative patellar compression  Ambulation     Ambulation: Level Surfaces   Ambulation without assistive device: independent    Ambulation: Stairs   Ascend stairs: independent  Pattern: non-reciprocal  Railings: one rail  Descend stairs: independent  Pattern: non-reciprocal  Railings: one rail  Curbs: independent    Observational Gait   Gait: antalgic   Decreased walking speed, stride length and right stance time  Left foot contact pattern: foot flat  Right foot contact pattern: foot flat    Quality of Movement During Gait     Knee    Knee (Left): Positive valgus  Knee (Right): Positive valgus                Precautions: None    Re-eval Date: 3/20/2023

## 2023-02-20 NOTE — PROGRESS NOTES
Daily Note     Today's date: 2023  Patient name: Ivelisse Thapa  : 1985  MRN: [id number]  Referring provider: Karen Noriega DO  Dx:   Encounter Diagnosis     ICD-10-CM    1  Acute pain of right knee  M25 561           Start Time:           Subjective: My knee pain is only about a 4/10 today  Objective: See treatment diary below    Assessment: Tolerated treatment well  Pt was able to perform ex correctly, with good form and without c/o increased pain at R knee  Minimal vc's needed t/o session  Pt does exhibit strength, endurance and ROM deficits and would benefit from continued PT    Plan: Continue per plan of care  Add trial of leg press NV  See RE for full findings       Precautions: None    Re-eval Date: 2023      Manuals  1-   R knee PROM 2' 2' 2' 2' 2'  2' 2' 2'   Dharmesh calf, hs, glute stretch 10' 10' 10' 10' 10'  10' 10' 10'                         Neuro Re-Ed     1-9     Balance as appropriate                                            Ther Ex     1-9     Nustep L1 10' L 2  10' L1  10' L1  10' L1  10' L1  10' L1  10' L1  12'   Quad set   5" x10 5"x15 5" x 15  5" x 15 5"x15 5"x15   LAQ 5"x15 5" x15 5" x10 5"x15 5" x 15 5" x15 5"x 15 5" x 15   Hamstring curls Red 1x15  Red   1x15      Red 1x15    SLR 5"x15 5" x15 5" x10 5"x15 5"x15 5" x15 5"x15 5' x 15    Bridges 1x15  1x15 1x10 1x15 1x15  1x15 1x15  1x 15    Clamshells           S/L hip abd   1x10 1x10 1x10 1x10     Standing hip flex/ext/abd R LE  1x15 ea  R LE  1x15  ea R LE only  Flex/abd  1x10 ea R LE only   1x10 ea  R LE only   1x10 ea  R LE only  1x10 ea R LE   1x10 ea  R LE    1x15 ea    Squat 1x15  1x15 NV 1x10 1x10  1x10 1x10 1x15    Leg press                      Ther Activity           Step up/down Fwd C   1x15 Fwd  C  1x15      Fwd C   1x15               Gait Training                                 Modalities  2-20 12-19   1-9     CP           MHP Pt declined  Pt declined Supine w/roll under knees  10' Supine w/roll under knees  10' Supine w/roll under knees 10' Supine w/roll  Under knees  10' Pt declined  Pt declined

## 2023-02-23 ENCOUNTER — OFFICE VISIT (OUTPATIENT)
Dept: PHYSICAL THERAPY | Facility: HOME HEALTHCARE | Age: 38
End: 2023-02-23

## 2023-02-23 DIAGNOSIS — M25.561 ACUTE PAIN OF RIGHT KNEE: Primary | ICD-10-CM

## 2023-02-23 NOTE — PROGRESS NOTES
Daily Note     Today's date: 2023  Patient name: Shane Queen  : 1985  MRN: [id number]  Referring provider: Geo Eastman DO  Dx:   Encounter Diagnosis     ICD-10-CM    1  Acute pain of right knee  M25 561                      Subjective: Pt reports no pain R knee right now  Objective: See treatment diary below      Assessment: Tolerated treatment well  Progressed to leg press this session  Pt with no c/o pain R knee t/o session or at end of session  Patient would benefit from continued PT to improve ROM, strength and overall functional mobility  Plan: Continue per plan of care        Precautions: None    Re-eval Date: 2023      Manuals - 1-   R knee PROM 2' 2' 2' 2' 2'  2' 2' 2'   Dharmesh calf, hs, glute stretch 10' 10' 10' 10' 10'  10' 10' 10'                         Neuro Re-Ed      1-9     Balance as appropriate                                            Ther Ex      1-9     Nustep L1 10' L 2  10' L2  10' L1  10' L1  10' L1  10' L1  10' L1  12'   Quad set    5"x15 5" x 15  5" x 15 5"x15 5"x15   LAQ 5"x15 5" x15 5" x15 5"x15 5" x 15 5" x15 5"x 15 5" x 15   Hamstring curls Red 1x15  Red   1x15 Red   1x15      Red 1x15    SLR 5"x15 5" x15 5" x15 5"x15 5"x15 5" x15 5"x15 5' x 15    Bridges 1x15  1x15 1x15 1x15 1x15  1x15 1x15  1x 15    Clamshells           S/L hip abd    1x10 1x10 1x10     Standing hip flex/ext/abd R LE  1x15 ea  R LE  1x15  ea R LE only  Flex/abd  1x15 ea R LE only   1x10 ea  R LE only   1x10 ea  R LE only  1x10 ea R LE   1x10 ea  R LE    1x15 ea    Squat 1x15  1x15 1x15 1x10 1x10  1x10 1x10 1x15    Leg press                      Ther Activity           Step up/down Fwd C   1x15 Fwd  C  1x15 Fwd C  1x15     Fwd C   1x15               Gait Training                                 Modalities  2-20    1-9     CP           MHP Pt declined  Pt declined Pt declined  Supine w/roll under knees  10' Supine w/roll under knees 10' Supine w/roll  Under knees  10' Pt declined  Pt declined

## 2023-08-24 ENCOUNTER — HOSPITAL ENCOUNTER (EMERGENCY)
Facility: HOSPITAL | Age: 38
Discharge: HOME | End: 2023-08-24
Attending: EMERGENCY MEDICINE
Payer: COMMERCIAL

## 2023-08-24 VITALS
SYSTOLIC BLOOD PRESSURE: 154 MMHG | OXYGEN SATURATION: 100 % | HEART RATE: 76 BPM | TEMPERATURE: 96.7 F | DIASTOLIC BLOOD PRESSURE: 79 MMHG | RESPIRATION RATE: 18 BRPM

## 2023-08-24 DIAGNOSIS — K02.9 PAIN DUE TO DENTAL CARIES: Primary | ICD-10-CM

## 2023-08-24 PROCEDURE — 99284 EMERGENCY DEPT VISIT MOD MDM: CPT | Performed by: EMERGENCY MEDICINE

## 2023-08-24 PROCEDURE — 99282 EMERGENCY DEPT VISIT SF MDM: CPT

## 2023-08-24 RX ORDER — LIDOCAINE HYDROCHLORIDE 20 MG/ML
10 SOLUTION OROPHARYNGEAL ONCE
Status: COMPLETED | OUTPATIENT
Start: 2023-08-24 | End: 2023-08-24

## 2023-08-24 RX ORDER — CHLORHEXIDINE GLUCONATE 0.12 MG/ML
15 RINSE ORAL 2 TIMES DAILY
Qty: 120 ML | Refills: 0 | Status: SHIPPED | OUTPATIENT
Start: 2023-08-24

## 2023-08-24 RX ORDER — NAPROXEN 500 MG/1
500 TABLET ORAL ONCE
Status: COMPLETED | OUTPATIENT
Start: 2023-08-24 | End: 2023-08-24

## 2023-08-24 RX ORDER — NAPROXEN 500 MG/1
500 TABLET ORAL 2 TIMES DAILY WITH MEALS
Qty: 30 TABLET | Refills: 0 | Status: SHIPPED | OUTPATIENT
Start: 2023-08-24

## 2023-08-24 RX ORDER — AMOXICILLIN AND CLAVULANATE POTASSIUM 875; 125 MG/1; MG/1
1 TABLET, FILM COATED ORAL EVERY 12 HOURS
Qty: 14 TABLET | Refills: 0 | Status: SHIPPED | OUTPATIENT
Start: 2023-08-24 | End: 2023-08-31

## 2023-08-24 RX ORDER — AMOXICILLIN AND CLAVULANATE POTASSIUM 875; 125 MG/1; MG/1
1 TABLET, FILM COATED ORAL ONCE
Status: COMPLETED | OUTPATIENT
Start: 2023-08-24 | End: 2023-08-24

## 2023-08-24 RX ADMIN — Medication 10 ML: at 19:54

## 2023-08-24 RX ADMIN — NAPROXEN 500 MG: 500 TABLET ORAL at 19:54

## 2023-08-24 RX ADMIN — AMOXICILLIN AND CLAVULANATE POTASSIUM 1 TABLET: 875; 125 TABLET, FILM COATED ORAL at 19:54

## 2023-08-24 NOTE — ED PROVIDER NOTES
History  Chief Complaint   Patient presents with   • Dental Pain     Patient c/o left upper dental pain starting a week ago. HPI  This is a 26-year-old female presenting to the emergency department for evaluation of dental pain. Patient reports about 2-month history of intermittent but gradually progressive dental pain which is worse in the last few days. She describes pain emanating from the left upper molar region. The pain does not radiate. She denies any intraoral drainage or acute trauma. Reports that her wisdom teeth never erupted and is unsure if this is the cause for her pain reports pain is coming from the most posterior molar. Denies any difficulty with swallowing or pain with neck movement or change in voice or drooling or trouble tolerating secretions. Taking nothing for symptoms. Does have dentist appointment scheduled for November. No known allergies. Prior to Admission Medications   Prescriptions Last Dose Informant Patient Reported?  Taking?   aspirin-acetaminophen-caffeine ([facility] [employer]) 250-250-65 MG per tablet   Yes No   Sig: Take 1 tablet by mouth every 6 (six) hours as needed for headaches   ibuprofen (MOTRIN) 600 mg tablet   No No   Sig: Take 1 tablet (600 mg total) by mouth every 6 (six) hours as needed (pain, fever (= 3 of the 200 mg OTC tablets))   naproxen (Naprosyn) 500 mg tablet   No No   Sig: Take 1 tablet (500 mg total) by mouth 2 (two) times a day with meals   topiramate (TOPAMAX) 25 mg sprinkle capsule   No No   Sig: take 1 capsule by mouth AT NIGHT FOR 3 NIGHTS, THEN INCREASE BY 1 CAPSULE EVERY THIRD NIGHT UNTIL 4 CAPSULES AT NIGHT      Facility-Administered Medications: None       Past Medical History:   Diagnosis Date   • Migraine    • Osteoarthritis        Past Surgical History:   Procedure Laterality Date   • CERVICAL BIOPSY N/A 2020    Procedure: BIOPSY CONE;  Surgeon: Gaston Pereyra MD;  Location: [address] [place] [address];  Service: Gynecology   •  SECTION • NE LAPAROSCOPY FULGURATION OVIDUCTS Bilateral 1/27/2020    Procedure: LAPAROSCOPIC TUBAL COAGULATION;  Surgeon: Yun Granados MD;  Location: [facility] MAIN OR;  Service: Gynecology       Family History   Problem Relation Age of Onset   • Diabetes Mother      I have reviewed and agree with the history as documented. E-Cigarette/Vaping   • E-Cigarette Use Never User      E-Cigarette/Vaping Substances     Social History     Tobacco Use   • Smoking status: Every Day     Packs/day: 0.25     Types: Cigarettes   • Smokeless tobacco: Never   • Tobacco comments:     every three days   Vaping Use   • Vaping Use: Never used   Substance Use Topics   • Alcohol use: No   • Drug use: Yes     Types: Marijuana       Review of Systems   Constitutional: Negative for chills and fever. HENT: Positive for dental problem. Negative for drooling, ear pain, facial swelling, sore throat, trouble swallowing and voice change. Eyes: Negative for pain and visual disturbance. Respiratory: Negative for cough and shortness of breath. Cardiovascular: Negative for chest pain and palpitations. Gastrointestinal: Negative for abdominal pain and vomiting. Genitourinary: Negative for dysuria and hematuria. Musculoskeletal: Negative for arthralgias and back pain. Skin: Negative for color change and rash. Neurological: Negative for seizures and syncope. All other systems reviewed and are negative. Physical Exam  Physical Exam  Vitals and nursing note reviewed. Exam conducted with a chaperone present. Constitutional:       General: She is not in acute distress. Appearance: Normal appearance. She is well-developed. HENT:      Head: Normocephalic and atraumatic. No right periorbital erythema or left periorbital erythema. Jaw: There is normal jaw occlusion. No trismus, swelling, pain on movement or malocclusion.       Right Ear: External ear normal.      Left Ear: External ear normal.      Nose: Nose normal. Mouth/Throat:      Lips: Pink. Mouth: Mucous membranes are moist. No oral lesions. Dentition: Abnormal dentition. Dental tenderness, gingival swelling and dental caries present. No dental abscesses. Pharynx: Oropharynx is clear. Uvula midline. No pharyngeal swelling, oropharyngeal exudate, posterior oropharyngeal erythema or uvula swelling. Comments: No Seymour's angina. No dental abscess. Tenderness over gum near tooth #15    Eyes:      Conjunctiva/sclera: Conjunctivae normal.   Cardiovascular:      Rate and Rhythm: Normal rate and regular rhythm. Heart sounds: No murmur heard. Pulmonary:      Effort: Pulmonary effort is normal. No respiratory distress. Breath sounds: Normal breath sounds. Abdominal:      Palpations: Abdomen is soft. Tenderness: There is no abdominal tenderness. Musculoskeletal:         General: No swelling. Cervical back: Neck supple. Skin:     General: Skin is warm and dry. Capillary Refill: Capillary refill takes less than 2 seconds. Neurological:      Mental Status: She is alert.    Psychiatric:         Mood and Affect: Mood normal.         Vital Signs  ED Triage Vitals [08/24/23 1935]   Temperature Pulse Respirations Blood Pressure SpO2   (!) 96.7 °F (35.9 °C) 76 18 154/79 100 %      Temp Source Heart Rate Source Patient Position - Orthostatic VS BP Location FiO2 (%)   Temporal Monitor Sitting Right arm --      Pain Score       10 - Worst Possible Pain           Vitals:    08/24/23 1935   BP: 154/79   Pulse: 76   Patient Position - Orthostatic VS: Sitting         Visual Acuity      ED Medications  Medications   naproxen (NAPROSYN) tablet 500 mg (has no administration in time range)   Lidocaine Viscous HCl (XYLOCAINE) 2 % mucosal solution 10 mL (has no administration in time range)   amoxicillin-clavulanate (AUGMENTIN) 875-125 mg per tablet 1 tablet (has no administration in time range)       Diagnostic Studies  Results Reviewed     None No orders to display              Procedures  Procedures         ED Course                                             Medical Decision Making  68-year-old female presenting for worsening on and off dental pain x2 months. Follow-up scheduled several months out with dentist.  Here for worsening symptoms. No dental abscess or evidence of impending airway compromise. No facial swelling. Will treat with temporizing measures including antibiotics, NSAIDs, lidocaine topically, Peridex, provided dental clinic follow-up information advised to keep original appointment as well. Return to ED precautions discussed. Allergies reviewed. Agreeable to plan. Pain due to dental caries: acute illness or injury  Risk  Prescription drug management. Disposition  Final diagnoses:   Pain due to dental caries     Time reflects when diagnosis was documented in both MDM as applicable and the Disposition within this note     Time User Action Codes Description Comment    8/24/2023  7:45 PM Andrea Figueroa Add [K02.9] Pain due to dental caries       ED Disposition     ED Disposition   Discharge    Condition   Stable    Date/Time   Thu Aug 24, 2023  7:45 PM    Comment   Vangie Platt discharge to home/self care. Follow-up Information     Follow up With Specialties Details Why [address] [place] [address]  Schedule an appointment as soon as possible for a visit   [address] [address] [phone]          Patient's Medications   Discharge Prescriptions    AMOXICILLIN-CLAVULANATE (AUGMENTIN) 875-125 MG PER TABLET    Take 1 tablet by mouth every 12 (twelve) hours for 7 days       Start Date: 8/24/2023 End Date: 8/31/2023       Order Dose: 1 tablet       Quantity: 14 tablet    Refills: 0    CHLORHEXIDINE (PERIDEX) 0.12 % SOLUTION    Apply 15 mL to the mouth or throat 2 (two) times a day       Start Date: 8/24/2023 End Date: --       Order Dose: 15 mL       Quantity: 120 mL    Refills: 0    NAPROXEN (NAPROSYN) 500 MG TABLET    Take 1 tablet (500 mg total) by mouth 2 (two) times a day with meals       Start Date: 8/24/2023 End Date: --       Order Dose: 500 mg       Quantity: 30 tablet    Refills: 0       No discharge procedures on file.     PDMP Review     None          ED Provider  Electronically Signed by           Dc Deng DO  08/24/23 1953

## 2023-10-05 ENCOUNTER — TELEPHONE (OUTPATIENT)
Dept: FAMILY MEDICINE CLINIC | Facility: CLINIC | Age: 38
End: 2023-10-05

## 2023-10-05 NOTE — TELEPHONE ENCOUNTER
Hi, my name is Starr Ferrer. I'm calling because I have a dentist appointment with you guys, but it's not until November. I need to know if I can be moved up or something. I'm in excruciating pain from this toothache. I've already been to the hospital twice Please, if somebody can give me a call back at [phone], I'd really appreciate it. Thank you. Have a wonderful day.

## 2023-10-09 ENCOUNTER — TELEPHONE (OUTPATIENT)
Dept: DENTISTRY | Facility: CLINIC | Age: 38
End: 2023-10-09

## 2023-10-30 ENCOUNTER — TELEPHONE (OUTPATIENT)
Dept: DENTISTRY | Facility: CLINIC | Age: 38
End: 2023-10-30

## 2023-10-30 NOTE — TELEPHONE ENCOUNTER
10/9/23 10:03 AM  Note     LVM to schedule the patient with Dr. Brianna Garrison at 3 or 3:30 today for an emergency visit. Callled and talked with Kellen Mclaughlin to see if she wanted to come in today with dental to take care of her dental pain. She thanked me and stated that she already went elsewhere and had the treatment done.  - SS

## (undated) DEVICE — BETHLEHEM UNIVERSAL GYN LAP PK: Brand: CARDINAL HEALTH

## (undated) DEVICE — TROCAR: Brand: KII SHIELDED BLADED ACCESS SYSTEM

## (undated) DEVICE — SPONGE STICK WITH PVP-I: Brand: KENDALL

## (undated) DEVICE — CATH FOLEY COUDE 16FR 5ML 2 WAY TIEMANN LUBRICATH

## (undated) DEVICE — Device

## (undated) DEVICE — PENCIL ELECTROSURG E-Z CLEAN -0035H

## (undated) DEVICE — [HIGH FLOW INSUFFLATOR,  DO NOT USE IF PACKAGE IS DAMAGED,  KEEP DRY,  KEEP AWAY FROM SUNLIGHT,  PROTECT FROM HEAT AND RADIOACTIVE SOURCES.]: Brand: PNEUMOSURE

## (undated) DEVICE — GLOVE SRG LF STRL BGL SKNSNS 7 PF

## (undated) DEVICE — INSUFFLATION NEEDLE: Brand: SURGINEEDLE

## (undated) DEVICE — PLASTIC ADHESIVE BANDAGE: Brand: CURITY

## (undated) DEVICE — CHLORAPREP HI-LITE 26ML ORANGE

## (undated) DEVICE — 1840 FOAM BLOCK NEEDLE COUNTER: Brand: DEVON

## (undated) DEVICE — COTTON TIP APPLICTOR 2 PK

## (undated) DEVICE — INTENDED FOR TISSUE SEPARATION, AND OTHER PROCEDURES THAT REQUIRE A SHARP SURGICAL BLADE TO PUNCTURE OR CUT.: Brand: BARD-PARKER ® CARBON RIB-BACK BLADES

## (undated) DEVICE — POOLE SUCTION HANDLE W/TUBING: Brand: CARDINAL HEALTH

## (undated) DEVICE — D&C PACK: Brand: MEDLINE INDUSTRIES, INC.

## (undated) DEVICE — TRANSPOSAL ULTRAFLEX DUO/QUAD ULTRA CART MANIFOLD